# Patient Record
Sex: FEMALE | Race: OTHER | Employment: UNEMPLOYED | URBAN - METROPOLITAN AREA
[De-identification: names, ages, dates, MRNs, and addresses within clinical notes are randomized per-mention and may not be internally consistent; named-entity substitution may affect disease eponyms.]

---

## 2017-06-07 NOTE — TELEPHONE ENCOUNTER
Received incoming fax from patient's [facility] pharmacy requesting a refill for Lisinopril-HCTZ that was originally written on 10-26-16 for 90 tabs with one refill. The patient was to return for follow-up 6 months from last office visit on 10-18-16. Routing to the provider for review.  Tevin Ledzema RN

## 2017-06-12 RX ORDER — LISINOPRIL AND HYDROCHLOROTHIAZIDE 12.5; 2 MG/1; MG/1
1 TABLET ORAL DAILY
Qty: 90 TAB | Refills: 0 | Status: SHIPPED | OUTPATIENT
Start: 2017-06-12 | End: 2017-09-28 | Stop reason: SDUPTHER

## 2017-06-12 NOTE — TELEPHONE ENCOUNTER
Patient notified and informed about refill and that she needs to f/u before refill runs out, she plans to rtc.

## 2017-09-28 ENCOUNTER — OFFICE VISIT (OUTPATIENT)
Dept: FAMILY MEDICINE CLINIC | Age: 42
End: 2017-09-28

## 2017-09-28 VITALS
DIASTOLIC BLOOD PRESSURE: 90 MMHG | WEIGHT: 145.6 LBS | TEMPERATURE: 98.4 F | BODY MASS INDEX: 27.49 KG/M2 | HEART RATE: 68 BPM | SYSTOLIC BLOOD PRESSURE: 151 MMHG | HEIGHT: 61 IN

## 2017-09-28 DIAGNOSIS — R09.81 STUFFY NOSE: ICD-10-CM

## 2017-09-28 RX ORDER — LISINOPRIL AND HYDROCHLOROTHIAZIDE 12.5; 2 MG/1; MG/1
1 TABLET ORAL DAILY
Qty: 90 TAB | Refills: 1 | Status: SHIPPED | OUTPATIENT
Start: 2017-09-28 | End: 2018-03-08 | Stop reason: SDUPTHER

## 2017-09-28 RX ORDER — LORATADINE 10 MG/1
10 TABLET ORAL DAILY
Qty: 30 TAB | Refills: 11 | Status: SHIPPED | OUTPATIENT
Start: 2017-09-28 | End: 2019-06-27

## 2017-09-28 RX ORDER — CALCIUM CARBONATE 750 MG/1
TABLET, CHEWABLE ORAL
Qty: 1 KIT | Refills: 0 | Status: SHIPPED | OUTPATIENT
Start: 2017-09-28 | End: 2020-04-30

## 2017-09-28 NOTE — PROGRESS NOTES
Subjective:     Aime Orourke is a 40 y.o. female who presents for follow up of htn. Diet and Lifestyle: states she hasn't missed any pills, has a hard time getting here because of her work. Cardiovascular ROS: no medication side effects noted, no dyspnea on exertion, no swelling of ankles. New concerns: right earache, which is recurrent over the last year. States she was given something here that helped. Has nasal congestion fairly constantly on the right side too. .     Patient Active Problem List   Diagnosis Code    HTN (hypertension) I10     History reviewed. No pertinent family history.   Social History   Substance Use Topics    Smoking status: Never Smoker    Smokeless tobacco: Never Used    Alcohol use No        Lab Results  Component Value Date/Time   Cholesterol, total 171 05/31/2016 12:09 PM   HDL Cholesterol 39 05/31/2016 12:09 PM   LDL, calculated 99.2 05/31/2016 12:09 PM   Triglyceride 164 05/31/2016 12:09 PM   CHOL/HDL Ratio 4.4 05/31/2016 12:09 PM     Lab Results  Component Value Date/Time   GFR est non-AA >60 05/31/2016 12:09 PM   GFR est AA >60 05/31/2016 12:09 PM   Creatinine 0.64 05/31/2016 12:09 PM   BUN 9 05/31/2016 12:09 PM   Sodium 139 05/31/2016 12:09 PM   Potassium 3.8 05/31/2016 12:09 PM   Chloride 105 05/31/2016 12:09 PM   CO2 23 05/31/2016 12:09 PM     Lab Results   Component Value Date/Time    Sodium 139 05/31/2016 12:09 PM    Potassium 3.8 05/31/2016 12:09 PM    Chloride 105 05/31/2016 12:09 PM    CO2 23 05/31/2016 12:09 PM    Anion gap 11 05/31/2016 12:09 PM    Glucose 113 05/31/2016 12:09 PM    BUN 9 05/31/2016 12:09 PM    Creatinine 0.64 05/31/2016 12:09 PM    BUN/Creatinine ratio 14 05/31/2016 12:09 PM    GFR est AA >60 05/31/2016 12:09 PM    GFR est non-AA >60 05/31/2016 12:09 PM    Calcium 8.9 05/31/2016 12:09 PM                   Objective:   Vitals 9/28/2017 10/18/2016 10/18/2016 10/18/2016 5/31/2016 5/31/2016 5/31/2016   Blood Pressure 151/90 150/78 154/84 151/86 157/92 156/94 -   Pulse 68 - 65 69 72 81 -   Temp 98.4 - - 97.7 - 98.2 -   Height 5' 0.827\" - - 5' 1.024\" - - -   Weight 145 lb 9.6 oz - - 144 lb - 149 lb 6.4 oz -   SpO2 - - - - - - -   BSA 1.68 m2 - - 1.68 m2 - - -   BMI 27.67 kg/m2 - - 27.19 kg/m2 - - -   BP comment - - - - - - ra       Appearance: alert, well appearing, and in no distress. bp by my check 160/90. General exam: left TM partly obscured by wax and Tm dull. Right TM totally obscured by wax, decreased hearing of rubbed figertip on right compared with left. CVS exam BP noted to be moderately elevated today in office, S1, S2 normal, no gallop, no murmur, chest clear, no JVD, no HSM, no edema. Lab review:     Assessment/Plan:     1.hypertension control uncertain. the following changes are made - recheck 2 weeks. she will also monitor at home trying to keep under 140/88. if it remains elevated, will discuss lifestyle and consider med change. refilled current htn med. 2.. Allergic ETD, with cerumen impaction. To use debrox nightly and f/u 2 weeks. Refilled loratidine.

## 2017-09-28 NOTE — PROGRESS NOTES
Coordination of Care  1. Have you been to the ER, urgent care clinic since your last visit? Hospitalized since your last visit? No    2. Have you seen or consulted any other health care providers outside of the [facility] since your last visit? Include any pap smears or colon screening. No    Medications  Medication Reconciliation Performed: no  Patient does need refills     Learning Assessment Complete?  Yes

## 2018-03-08 ENCOUNTER — OFFICE VISIT (OUTPATIENT)
Dept: FAMILY MEDICINE CLINIC | Age: 43
End: 2018-03-08

## 2018-03-08 VITALS
BODY MASS INDEX: 27.75 KG/M2 | SYSTOLIC BLOOD PRESSURE: 130 MMHG | HEIGHT: 61 IN | TEMPERATURE: 98.8 F | WEIGHT: 147 LBS | DIASTOLIC BLOOD PRESSURE: 78 MMHG | HEART RATE: 87 BPM

## 2018-03-08 DIAGNOSIS — M25.511 ACUTE PAIN OF RIGHT SHOULDER: Primary | ICD-10-CM

## 2018-03-08 RX ORDER — LISINOPRIL AND HYDROCHLOROTHIAZIDE 12.5; 2 MG/1; MG/1
1 TABLET ORAL DAILY
Qty: 90 TAB | Refills: 1 | Status: SHIPPED | OUTPATIENT
Start: 2018-03-08 | End: 2018-05-03 | Stop reason: SDUPTHER

## 2018-03-08 RX ORDER — METHOCARBAMOL 500 MG/1
500 TABLET, FILM COATED ORAL 3 TIMES DAILY
Qty: 15 TAB | Refills: 0 | Status: SHIPPED | OUTPATIENT
Start: 2018-03-08 | End: 2018-05-03 | Stop reason: SDUPTHER

## 2018-03-08 RX ORDER — NAPROXEN 500 MG/1
500 TABLET ORAL 2 TIMES DAILY WITH MEALS
Qty: 60 TAB | Refills: 0 | Status: SHIPPED | OUTPATIENT
Start: 2018-03-08 | End: 2018-05-03 | Stop reason: SDUPTHER

## 2018-03-08 NOTE — PATIENT INSTRUCTIONS
Dolor en el hombro: Instrucciones de cuidado - [ Shoulder Pain: Care Instructions ]  Instrucciones de cuidado    Puede lesionar beach hombro al usarlo demasiado brice Winner, belkis pescar o jugar béisbol. Puede suceder belkis parte del desgaste cotidiano por el envejecimiento. Las lesiones de hombro pueden tardar tiempo en sanar, bina beach hombro debería mejorar con [employer]. Beach médico podría recomendar un cabestrillo para descansar el hombro. Si se lesionó el hombro, robin vez necesite pruebas y [place]. La atención de seguimiento es ruth parte clave de beach tratamiento y seguridad. Asegúrese de hacer y acudir a todas las citas, y llame a beach médico si está teniendo problemas. También es ruth buena idea saber los resultados de los exámenes y mantener ruth lista de los medicamentos que danny. ¿Cómo puede cuidarse en el hogar? · [place] [employer] analgésicos (medicamentos para el dolor) exactamente según las indicaciones. ¨ Si el médico le recetó analgésicos, tómelos según las indicaciones. ¨ Si no está tomando un analgésico recetado, pregúntele a beach médico si puede gee prakash de [employer]. ¨ No tome dos o más analgésicos al [facility], a menos que el médico se lo haya indicado. Muchos analgésicos contienen acetaminofén, es decir, Tylenol. El exceso de acetaminofén (Tylenol) puede ser dañino. · Si beach médico le recomienda usar un cabestrillo, úselo belkis se le haya indicado. No se lo quite antes de que se lo indique el médico.  · Aplíquese hielo o ruth compresa fría sobre la sg adolorida brice 10 a 20 minutos cada vez. Póngase un paño kaur entre el hielo y la piel. · Si no hay hinchazón, puede aplicar calor húmedo, ruth almohadilla térmica o un paño tibio sobre el hombro. Algunos médicos sugieren alternar CHUN Sahu y holly. · Descanse el hombro brice algunos días. Si beach médico lo recomienda, puede comenzar a ejercitar el hombro con suavidad, bina no levante nada pesado. ¿Cuándo debe pedir ayuda?   Llame al 911 en cualquier momento que considere que necesita atención de [place]. Por ejemplo, llame si:  ? · Siente dolor u opresión en el pecho. [place] podría ocurrir junto con:  ¨ Sudoración. ¨ Falta de aire. ¨ Náuseas o vómito. ¨ Dolor que se extiende del pecho al nate, la Analy, o hacia prakash o ambos hombros o [id number]. ¨ Mareos o aturdimiento. ¨ Pulso rápido o irregular. Después de llamar al 911, mastique 1 aspirina para adultos. Espere a la ambulancia. No trate de conducir usted mismo un automóvil. ? · Beach brazo o mano está frío o pálido, o cambia de color. ?Llame a beach médico ahora mismo o busque atención médica inmediata si:  ? · Tiene señales de infección, tales belkis:  ¨ Mayor dolor, hinchazón, enrojecimiento o aumento de la temperatura en el hombro. ¨ Vetas rojizas que comienzan en ruth sg del hombro. ¨ Pus que supura de ruth sg del hombro. ¨ Ganglios linfáticos inflamados en el nate, las axilas o la silvano. Jordan Haji. ?Preste especial atención a los cambios en beach sandeep y asegúrese de comunicarse con beach médico si:  ? · No puede usar el hombro. ? · El hombro no mejora belkis se esperaba. ¿Dónde puede encontrar más información en inglés? Sam Presume a http://pretty-pema.info/. Foreign Hebert R899 en la búsqueda para aprender más acerca de \"Dolor en el hombro: Instrucciones de cuidado - [ Shoulder Pain: Care Instructions ]. \"  Revisado: 21 marzo, 2017  Versión del contenido: 11.4  © 3435-5080 Healthwise, Incorporated. Las instrucciones de cuidado fueron adaptadas bajo licencia por [employer]ctions (which disclaims liability or warranty for this information). Si usted tiene [place] afección médica o sobre estas instrucciones, siempre pregunte a beach profesional de sandeep. [employer], [employer]ET niega toda garantía o responsabilidad por beach uso de esta información.        Estiramientos de hombros: Ejercicios - [ Shoulder Stretches: Exercises ]  Instrucciones de Cynthea Mccollum se presentan algunos ejemplos de ejercicios para el hombro. Empiece cada ejercicio lentamente. Reduzca la intensidad del ejercicio si Carlena Plant a tener dolor. Logan médico o fisioterapeuta le dirán cuándo puede comenzar con estos ejercicios y cuáles funcionarán mejor para usted. Cómo hacer los ejercicios  Estiramiento del hombro    1. Párese en un olga de dipika y coloque un brazo contra el olga de la Kobe. El codo debe estar un poco más alto que el hombro. 2. Relaje los hombros a medida que se inclina hacia adelante, permitiendo que se estiren los músculos del pecho y del hombro. También puede girar logan cuerpo ligeramente alejándolo de logan brazo para estirar los músculos aún [place]. 3. Mantenga la posición entre 15 y 27 segundos. 4. Repita de 2 a 4 veces con cada brazo. Estiramiento del hombro y del pecho    1. Estiramiento del hombro y del pecho  2. Estando sentado, relaje la parte superior de logan cuerpo de modo que se deje caer suavemente en logan silla. 3. A medida que inhala, enderece la espalda y maria elena los [employer]. 4. Jale suavemente los omóplatos hacia atrás y [place]. 5. Mantenga la posición brice 15 a 30 segundos mientras respira normalmente. 6. Repita de 2 a 4 veces. Estiramiento sobre la radha    1. [place] ambos brazos por encima de la radha. 2. Mantenga la posición entre 15 y 27 segundos. 3. Repita de 2 a 4 veces. La atención de seguimiento es ruth parte clave de logan tratamiento y seguridad. Asegúrese de hacer y acudir a todas las citas, y llame a logan médico si está teniendo problemas. También es ruth buena idea saber los resultados de los exámenes y mantener ruth lista de los medicamentos que danny. ¿Dónde puede encontrar más información en inglés? Anne-Marie Lower a http://pretty-pema.info/. Kenzie Mcqueen T260 en la búsqueda para aprender más acerca de \"Estiramientos de hombros: Ejercicios - [ Shoulder Stretches: Exercises ]. \"  Revisado: 21 marzo, 2017  Versión del contenido: 11.4  © 8637-7819 Healthwise, Mozido. Las instrucciones de cuidado fueron adaptadas bajo licencia por [employer] Help Connections (which disclaims liability or warranty for this information). Si usted tiene [place] afección médica o sobre estas instrucciones, siempre pregunte a logan profesional de sandeep. 5k Fans, Mozido niega toda garantía o responsabilidad por logan uso de esta información.

## 2018-03-08 NOTE — PROGRESS NOTES
Assessment/Plan:    Diagnoses and all orders for this visit:    1. Acute pain of right shoulder  -     methocarbamol (ROBAXIN) 500 mg tablet; Take 1 Tab by mouth three (3) times daily. [employer] 1 pastilla 3 veces al santiago si necissita  -     naproxen (NAPROSYN) 500 mg tablet; Take 1 Tab by mouth two (2) times daily (with meals). [employer] 1 pastilla 2 vecs al santiago    Other orders  -     lisinopril-hydroCHLOROthiazide (PRINZIDE, ZESTORETIC) 20-12.5 mg per tablet; Take 1 Tab by mouth daily. For blood pressure. Rest for the shoulder, pain meds as needed. She might consider another line of work which might be easier on her shoulder     Follow-up Disposition:  Return in about 6 months (around 9/8/2018), or if symptoms worsen or fail to improve. MOSES Jane expressed understanding of this plan. An AVS was printed and given to the patient.      ----------------------------------------------------------------------    Chief Complaint   Patient presents with    Shoulder Pain    Hypertension     f/u       History of Present Illness:  Pt here for new problem with right shoulder pain, no known injury. 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 She has asked for a long period of absence from this job and she was granted this by her boss. She has been using tylenol and ibu with some relief of the pain. She does not have numbness or tingling. She has been taking her bp meds as rx'd. She has not run out of her meds. She denies the following: chest pain, SoB, PEARSON, extremity swelling       Past Medical History:   Diagnosis Date    Hypertension     Pap smear for cervical cancer screening 2012 nov       Current Outpatient Prescriptions   Medication Sig Dispense Refill    methocarbamol (ROBAXIN) 500 mg tablet Take 1 Tab by mouth three (3) times daily.  [employer] 1 pastilla 3 veces al santiago si necissita 15 Tab 0    naproxen (NAPROSYN) 500 mg tablet Take 1 Tab by mouth two (2) times daily (with meals). [place] 1 pastilla 2 vecs al santiago 60 Tab 0    lisinopril-hydroCHLOROthiazide (PRINZIDE, ZESTORETIC) 20-12.5 mg per tablet Take 1 Tab by mouth daily. For blood pressure. 90 Tab 1    loratadine (CLARITIN) 10 mg tablet Take 1 Tab by mouth daily. Hema cada santiago para alergias 30 Tab 11    carbamide peroxide (DEBROX) 6.5 % otic solution Administer 5 Drops into each ear two (2) times a day. 7.5 mL 1    Blood Pressure Test Kit-Medium kit Can you pick out a reasonably-priced cuff that is likely to be accurate for her? Thanks. Also needs to know how to use it. 1 Kit 0       No Known Allergies    Social History   Substance Use Topics    Smoking status: Never Smoker    Smokeless tobacco: Never Used    Alcohol use No       No family history on file. Physical Exam:     Visit Vitals    /78 (BP 1 Location: Left arm, BP Patient Position: Sitting)    Pulse 87    Temp 98.8 °F (37.1 °C) (Oral)    Ht 5' 1.02\" (1.55 m)    Wt 147 lb (66.7 kg)    BMI 27.75 kg/m2     Looks well, pleasant  A&Ox3  WDWN NAD  Respirations normal and non labored  Right shoulder - no swelling, warmth or redness at the joint.  She has FROM but painful with end extension  She has pain with deep palp AC area and with active rotation

## 2018-03-08 NOTE — PROGRESS NOTES
Coordination of Care  1. Have you been to the ER, urgent care clinic since your last visit? Hospitalized since your last visit? No    2. Have you seen or consulted any other health care providers outside of the [address] [place] [address] since your last visit? Include any pap smears or colon screening. No    Does the patient need refills? YES    Learning Assessment Complete?  yes

## 2018-03-08 NOTE — PROGRESS NOTES
Pt left without coming to discharge today. I looked for pt in the gym as well and pt was not in the gym.  Sandra Soares RN

## 2018-05-03 ENCOUNTER — OFFICE VISIT (OUTPATIENT)
Dept: FAMILY MEDICINE CLINIC | Age: 43
End: 2018-05-03

## 2018-05-03 ENCOUNTER — TELEPHONE (OUTPATIENT)
Dept: FAMILY MEDICINE CLINIC | Age: 43
End: 2018-05-03

## 2018-05-03 VITALS
TEMPERATURE: 98.7 F | BODY MASS INDEX: 29.07 KG/M2 | DIASTOLIC BLOOD PRESSURE: 88 MMHG | HEIGHT: 61 IN | WEIGHT: 154 LBS | SYSTOLIC BLOOD PRESSURE: 156 MMHG | HEART RATE: 92 BPM

## 2018-05-03 DIAGNOSIS — M25.511 ACUTE PAIN OF RIGHT SHOULDER: Primary | ICD-10-CM

## 2018-05-03 DIAGNOSIS — I10 ESSENTIAL HYPERTENSION: ICD-10-CM

## 2018-05-03 RX ORDER — METHOCARBAMOL 500 MG/1
500 TABLET, FILM COATED ORAL 3 TIMES DAILY
Qty: 15 TAB | Refills: 0 | Status: SHIPPED | OUTPATIENT
Start: 2018-05-03 | End: 2019-06-27

## 2018-05-03 RX ORDER — NAPROXEN 500 MG/1
500 TABLET ORAL 2 TIMES DAILY WITH MEALS
Qty: 60 TAB | Refills: 0 | Status: SHIPPED | OUTPATIENT
Start: 2018-05-03 | End: 2019-06-27

## 2018-05-03 RX ORDER — LISINOPRIL AND HYDROCHLOROTHIAZIDE 12.5; 2 MG/1; MG/1
1 TABLET ORAL DAILY
Qty: 90 TAB | Refills: 1 | Status: SHIPPED | OUTPATIENT
Start: 2018-05-03 | End: 2019-06-27 | Stop reason: SDUPTHER

## 2018-05-03 NOTE — PROGRESS NOTES
Assessment/Plan:       ICD-10-CM ICD-9-CM    1. Acute pain of right shoulder M25.511 719.41 methocarbamol (ROBAXIN) 500 mg tablet      naproxen (NAPROSYN) 500 mg tablet   2. Essential hypertension I10 401.9 lisinopril-hydroCHLOROthiazide (PRINZIDE, ZESTORETIC) 20-12.5 mg per tablet   I demonstrated a stretching exercise patient can do every 30-60 minutes when she experiences pain. Try to wean off need for medication. Try heat/ice. Follow-up Disposition:  Return if symptoms worsen or fail to improve. [facility] [address] [place] [address] [address] [facility] 38. [phone]  Phone: [phone] Fax: [phone]  Subjective:     Chief Complaint   Patient presents with    Back Pain     f/u    Envertrufina Arredondo is a 40 y.o. OTHER female. HPI: It is the \"lung\" on the right, was asked to return after finishing the pills. Felt well while taking the pills. When making tortillas it hurts more. 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 Pain now is 7/10 without movement. Her pain goes up to a 9/10, takes a pill and it gets better. Not working currently, staying home. When she uses her hands a lot it hurts. She  has a past medical history of Hypertension and Pap smear for cervical cancer screening (2012 nov). Review of Systems: Negative for: fever, chest pain, shortness of breath, leg swelling, exertional dyspnea, palpitations. Current Medications:   Current Outpatient Prescriptions on File Prior to Visit   Medication Sig    loratadine (CLARITIN) 10 mg tablet Take 1 Tab by mouth daily. Hema cada santiago para alergias    carbamide peroxide (DEBROX) 6.5 % otic solution Administer 5 Drops into each ear two (2) times a day.  Blood Pressure Test Kit-Medium kit Can you pick out a reasonably-priced cuff that is likely to be accurate for her? Thanks. Also needs to know how to use it. No current facility-administered medications on file prior to visit. Social History: She  reports that she has never smoked. She has never used smokeless tobacco. She reports that she does not drink alcohol or use illicit drugs. Objective:     Vitals:    05/03/18 1012   BP: 156/88   Pulse: 92   Temp: 98.7 °F (37.1 °C)   TempSrc: Oral   Weight: 154 lb (69.9 kg)   Height: 5' 0.67\" (1.541 m)    Patient's last menstrual period was 02/28/2018. Wt Readings from Last 2 Encounters:   05/03/18 154 lb (69.9 kg)   03/08/18 147 lb (66.7 kg)     No results found for any visits on 05/03/18. Physical Examination: Tension right lateral back along thoracic vertebrae. General appearance - well developed, no acute distress. Chest - clear to auscultation. Heart - regular rate and rhythm without murmurs, rubs, or gallops. Abdomen - bowel sounds present x 4, NT, ND. Extremities - pulses intact. No peripheral edema. Assessment/Plan:   Diagnoses and all orders for this visit:    1. Acute pain of right shoulder  -     methocarbamol (ROBAXIN) 500 mg tablet; Take 1 Tab by mouth three (3) times daily. [place] 1 pastilla 3 veces al santiago si necissita  -     naproxen (NAPROSYN) 500 mg tablet; Take 1 Tab by mouth two (2) times daily (with meals). [place] 1 pastilla 2 vecs al santiago    2. Essential hypertension  -     lisinopril-hydroCHLOROthiazide (PRINZIDE, ZESTORETIC) 20-12.5 mg per tablet; Take 1 Tab by mouth daily. For blood pressure. Follow-up Disposition:  Return if symptoms worsen or fail to improve. Physical therapy will likely help as will stretching exercises. Marlon Contreras, BEVERLY, FNP-BC, BC-ADM  Aime Orourke expressed understanding of this plan.      20 seconds two times when shoulder hurts;

## 2018-05-03 NOTE — PROGRESS NOTES
Coordination of Care  1. Have you been to the ER, urgent care clinic since your last visit? Hospitalized since your last visit? No    2. Have you seen or consulted any other health care providers outside of the [address] [place] [address] since your last visit? Include any pap smears or colon screening. No    Does the patient need refills? YES    Learning Assessment Complete?  yes  Depression Screening complete in the past 12 months? yes

## 2018-05-03 NOTE — TELEPHONE ENCOUNTER
No, because this medication causes increased blood pressure and it can cause stomach ulcers/bleeding when taken long term. If she is needing it twice a day for the next 30 days, she needs to return to discuss long-term treatment options.

## 2018-05-03 NOTE — TELEPHONE ENCOUNTER
Fax received from patient's [facility] pharmacy stating that the patient is requesting a 90 day supply of Naproxen 500mg that was sent in today. Routing to the provider for review.  Tamara Spence RN

## 2018-05-04 NOTE — TELEPHONE ENCOUNTER
Received call from patient stating that she needs medication, she also stated she is having pain on her lungs. Please call back As soon as possible.       Message routed to Chris Whelan NP and [address]

## 2018-05-04 NOTE — TELEPHONE ENCOUNTER
Call returned w/ assistance of [employer] phone  #[phone]. Reviewed message per provider Kiah Oliveira \"no increase in naproxen amt. \".  Pt has \"not picked up her medications yet\". Instructed to get her 3 medications ordered yesterday and meds were reviewed. Instructed to [facility] the line \" for f/u. Reviewed ER for any emergency and ER visits will generated bills and  she will need to apply for financial assistance to help w/bills.

## 2019-06-27 ENCOUNTER — HOSPITAL ENCOUNTER (OUTPATIENT)
Dept: LAB | Age: 44
Discharge: HOME | End: 2019-06-27

## 2019-06-27 ENCOUNTER — OFFICE VISIT (OUTPATIENT)
Dept: FAMILY MEDICINE CLINIC | Age: 44
End: 2019-06-27

## 2019-06-27 VITALS
DIASTOLIC BLOOD PRESSURE: 73 MMHG | HEART RATE: 79 BPM | SYSTOLIC BLOOD PRESSURE: 126 MMHG | HEIGHT: 61 IN | WEIGHT: 141 LBS | BODY MASS INDEX: 26.62 KG/M2 | TEMPERATURE: 98.4 F

## 2019-06-27 DIAGNOSIS — I10 ESSENTIAL HYPERTENSION: ICD-10-CM

## 2019-06-27 DIAGNOSIS — I10 ESSENTIAL HYPERTENSION: Primary | ICD-10-CM

## 2019-06-27 PROCEDURE — 80053 COMPREHEN METABOLIC PANEL: CPT

## 2019-06-27 PROCEDURE — 83036 HEMOGLOBIN GLYCOSYLATED A1C: CPT

## 2019-06-27 RX ORDER — LISINOPRIL AND HYDROCHLOROTHIAZIDE 12.5; 2 MG/1; MG/1
1 TABLET ORAL DAILY
Qty: 90 TAB | Refills: 1 | Status: SHIPPED | OUTPATIENT
Start: 2019-06-27 | End: 2020-03-01

## 2019-06-27 NOTE — PROGRESS NOTES
Assessment/Plan:       ICD-10-CM ICD-9-CM    1. Essential hypertension X57 871.6 METABOLIC PANEL, COMPREHENSIVE      HEMOGLOBIN A1C WITH EAG      lisinopril-hydroCHLOROthiazide (PRINZIDE, ZESTORETIC) 20-12.5 mg per tablet     Follow-up and Dispositions    · Return in about 6 months (around 12/27/2019) for hypertension. [employer] [address] [place] [address], [address] [place] [address]  [address]  67. [phone]  Phone: [phone] Fax: [phone]      [facility] [address]  Subjective:     Chief Complaint   Patient presents with    Hypertension     follow up and med refill    Envertrufina Carvalho is a 38 y.o. OTHER female. HPI:   She  has a past medical history of Hypertension and Pap smear for cervical cancer screening (2012 nov). Review of Systems: Negative for: fever, chest pain, shortness of breath, leg swelling, exertional dyspnea, palpitations. Current Medications:   Current Outpatient Medications on File Prior to Visit   Medication Sig    Blood Pressure Test Kit-Medium kit Can you pick out a reasonably-priced cuff that is likely to be accurate for her? Thanks. Also needs to know how to use it. No current facility-administered medications on file prior to visit. Social History: She  reports that she has never smoked. She has never used smokeless tobacco. She reports that she does not drink alcohol or use drugs. Objective:     Vitals:    06/27/19 1420   BP: 126/73   Pulse: 79   Temp: 98.4 °F (36.9 °C)   TempSrc: Oral   Weight: 141 lb (64 kg)   Height: 5' 0.83\" (1.545 m)    Patient's last menstrual period was 05/30/2019. Wt Readings from Last 2 Encounters:   06/27/19 141 lb (64 kg)   05/03/18 154 lb (69.9 kg)   Patient states she has lost weight. No results found for any visits on 06/27/19. Physical Examination:  General appearance - well developed, no acute distress. Chest - clear to auscultation.   Heart - regular rate and rhythm without murmurs, rubs, or gallops. Abdomen - bowel sounds present x 4, NT, ND. Extremities - pulses intact. No peripheral edema. Assessment/Plan:   Diagnoses and all orders for this visit:    1. Essential hypertension  -     METABOLIC PANEL, COMPREHENSIVE; Future  -     HEMOGLOBIN A1C WITH EAG; Future  -     lisinopril-hydroCHLOROthiazide (PRINZIDE, ZESTORETIC) 20-12.5 mg per tablet; Take 1 Tab by mouth daily. For blood pressure. No labs since 2016. She has eaten today. Follow-up and Dispositions    · Return in about 6 months (around 12/27/2019) for hypertension. Kirill Shetty DNP, FNP-BC, BC-ADM  Aime Orourke expressed understanding of this plan.

## 2019-06-27 NOTE — PROGRESS NOTES
Reviewed AVS, prescription and pharmacy location with patient. AVS printed and given along with a goodrx coupon. Patient aware that provider would like to follow up about today's visit in 6 months as a walk in . This has been fully explained to the patient, who indicates understanding and agrees with plan. No further questions at this time.  Lulu Bennett RN

## 2019-06-27 NOTE — PROGRESS NOTES
Coordination of Care  1. Have you been to the ER, urgent care clinic since your last visit? Hospitalized since your last visit? No    2. Have you seen or consulted any other health care providers outside of the [address] [place] [address] since your last visit? Include any pap smears or colon screening. No    Does the patient need refills? YES    Learning Assessment Complete?  yes

## 2019-06-28 LAB
ALBUMIN SERPL-MCNC: 4.1 G/DL (ref 3.5–5)
ALBUMIN/GLOB SERPL: 1.4 {RATIO} (ref 1.1–2.2)
ALP SERPL-CCNC: 81 U/L (ref 45–117)
ALT SERPL-CCNC: 22 U/L (ref 12–78)
ANION GAP SERPL CALC-SCNC: 8 MMOL/L (ref 5–15)
AST SERPL-CCNC: 14 U/L (ref 15–37)
BILIRUB SERPL-MCNC: 0.6 MG/DL (ref 0.2–1)
BUN SERPL-MCNC: 13 MG/DL (ref 6–20)
BUN/CREAT SERPL: 17 (ref 12–20)
CALCIUM SERPL-MCNC: 9 MG/DL (ref 8.5–10.1)
CHLORIDE SERPL-SCNC: 103 MMOL/L (ref 97–108)
CO2 SERPL-SCNC: 26 MMOL/L (ref 21–32)
CREAT SERPL-MCNC: 0.76 MG/DL (ref 0.55–1.02)
EST. AVERAGE GLUCOSE BLD GHB EST-MCNC: 111 MG/DL
GLOBULIN SER CALC-MCNC: 3 G/DL (ref 2–4)
GLUCOSE SERPL-MCNC: 111 MG/DL (ref 65–100)
HBA1C MFR BLD: 5.5 % (ref 4.2–6.3)
POTASSIUM SERPL-SCNC: 3.8 MMOL/L (ref 3.5–5.1)
PROT SERPL-MCNC: 7.1 G/DL (ref 6.4–8.2)
SODIUM SERPL-SCNC: 137 MMOL/L (ref 136–145)

## 2019-12-26 ENCOUNTER — OFFICE VISIT (OUTPATIENT)
Dept: FAMILY MEDICINE CLINIC | Age: 44
End: 2019-12-26

## 2019-12-26 VITALS
BODY MASS INDEX: 27.19 KG/M2 | SYSTOLIC BLOOD PRESSURE: 122 MMHG | OXYGEN SATURATION: 99 % | HEART RATE: 79 BPM | TEMPERATURE: 98.1 F | WEIGHT: 144 LBS | DIASTOLIC BLOOD PRESSURE: 76 MMHG | HEIGHT: 61 IN

## 2019-12-26 DIAGNOSIS — G89.29 CHRONIC RIGHT SHOULDER PAIN: ICD-10-CM

## 2019-12-26 DIAGNOSIS — H10.022 OTHER MUCOPURULENT CONJUNCTIVITIS OF LEFT EYE: Primary | ICD-10-CM

## 2019-12-26 DIAGNOSIS — J06.9 URI, ACUTE: ICD-10-CM

## 2019-12-26 DIAGNOSIS — M25.511 CHRONIC RIGHT SHOULDER PAIN: ICD-10-CM

## 2019-12-26 RX ORDER — ERYTHROMYCIN 5 MG/G
OINTMENT OPHTHALMIC
Qty: 1 G | Refills: 1 | Status: SHIPPED | OUTPATIENT
Start: 2019-12-26 | End: 2020-04-30

## 2019-12-26 RX ORDER — PSEUDOEPHEDRINE HCL 30 MG
TABLET ORAL
Qty: 20 TAB | Refills: 0 | Status: SHIPPED | OUTPATIENT
Start: 2019-12-26 | End: 2020-04-30

## 2019-12-26 RX ORDER — IBUPROFEN 600 MG/1
600 TABLET ORAL
Qty: 60 TAB | Refills: 0 | Status: SHIPPED | OUTPATIENT
Start: 2019-12-26 | End: 2022-05-26 | Stop reason: SDUPTHER

## 2019-12-26 NOTE — PATIENT INSTRUCTIONS
Conjuntivitis: Instrucciones de cuidado - [ Pinkeye: Care Instructions ] Instrucciones de cuidado La conjuntivitis es el enrojecimiento y la hinchazón de la superficie del james y de la conjuntiva (el recubrimiento del párpado y de la parte matt del james). La conjuntivitis suele ser causada por ruth infección bacteriana o viral. El aire seco, las [place] humo y las sustancias químicas son otras causas comunes. La conjuntivitis suele sanar por sí thanh al cabo de 7 a 10 días. Los antibióticos solo ayudan si la conjuntivitis está causada por bacterias. La conjuntivitis causada por ruth infección se propaga fácilmente. Si ruth alergia o sustancia química es la causa de la conjuntivitis, esta no desaparecerá a menos que usted evite lo que la esté causando. La atención de seguimiento es ruth parte clave de logan tratamiento y seguridad. Asegúrese de hacer y acudir a todas las citas, y llame a logan médico si está teniendo problemas. También es ruth buena idea saber los resultados de chelsea exámenes y mantener ruth lista de los medicamentos que danny. Cómo puede cuidarse en el hogar? · Lávese las adelfo con frecuencia. Siempre láveselas antes y después de tratarse la conjuntivitis o de tocarse los ojos o la nixon. · Utilice un algodón húmedo o un paño limpio y húmedo para retirar las costras. Limpie desde la esquina interior del james hacia afuera. Use ruth parte limpia del paño para cada pasada. · Colóquese paños húmedos, fríos o tibios, sobre el james unas cuantas veces al día si el james le duele. · No use lentes de contacto ni maquillaje para los ojos hasta que la conjuntivitis haya desaparecido. Deseche todo el maquillaje para ojos que usaba cuando comenzó la conjuntivitis. Limpie chelsea lentes de contacto y logan estuche. Si [place] lentes de contacto desechables, use un par nuevo cuando el james haya sanado y sea seguro volver a usar lentes de contacto.  
· Si el médico le recetó ruth pomada o gotas antibióticas para los ojos, [address] indicaciones. Use el medicamento todo el tiempo indicado, aunque el james comience a verse mejor en poco tiempo. Mantenga limpia la punta del frasco y no permita que la misma toque la sg del james. · Para ponerse gotas para los ojos o pomada: ? Incline la Sirena Crow atrás y lleve el párpado inferior hacia abajo con un dedo. ? Deje caer unas gotas o un chorrito del medicamento dentro del párpado inferior. ? Cierre el james por entre 30 y 61 segundos para permitir que las gotas o la pomada se esparzan. ? No permita que la punta del gotero o del tubo de pomada toque las pestañas ni ninguna otra superficie. · No comparta toallas de baño, almohadas ni toallitas para la nixon mientras tenga conjuntivitis. Cuándo debe pedir ayuda? Llame a logan médico ahora mismo o busque atención médica inmediata si: 
  · Tiene dolor en el james, no solo irritación en la superficie.  
  · Tiene algún cambio en la vista o pérdida de la visión.  
  · Tiene mayor secreción del james.  
  · El james no ha comenzado a mejorar o empeora dentro de las 50 horas después de empezar a usar antibióticos.  
  · La conjuntivitis dura más de 7 días.  
 Preste especial atención a los cambios en logan sandeep y asegúrese de comunicarse con logan médico si tiene algún problema. Dónde puede encontrar más información en inglés? Johnny Beasley a http://pretty-pema.info/. [place] Y392 en la búsqueda para aprender más acerca de \"Conjuntivitis: Instrucciones de cuidado - [ Pj: Care Instructions ]. \" 
Revisado: 26 kwasi, 2019 Versión del contenido: 12.2 © 5972-6156 [employer]. Las instrucciones de cuidado fueron adaptadas bajo licencia por [employer] (which disclaims liability or warranty for this information). Si usted tiene [place] afección médica o sobre estas instrucciones, siempre pregunte a logan profesional de sandeep.  [employer] niega toda gloriaa o responsabilidad por logan uso de esta información. Estiramientos de hombros: Ejercicios - [ Shoulder Stretches: Exercises ] Instrucciones de cuidado Aquí se presentan algunos ejemplos de ejercicios para el hombro. Empiece cada ejercicio lentamente. Reduzca la intensidad del ejercicio si Malachy Spurling a tener dolor. Logan médico o fisioterapeuta le dirán cuándo puede comenzar con estos ejercicios y cuáles funcionarán mejor para usted. Cómo hacer los ejercicios Estiramiento del hombro 1. Párese en un olga de dipika y coloque un brazo contra el olga de altagracia Bullock. El codo debe estar un poco más alto que el hombro. 2. Relaje los hombros a medida que se inclina hacia adelante, permitiendo que se estiren los músculos del pecho y del hombro. También puede girar logan cuerpo ligeramente alejándolo de logan brazo para estirar los músculos aún [place]. 3. Mantenga la posición entre 15 y 27 segundos. 4. Repita de 2 a 4 veces con cada brazo. Estiramiento del hombro y del pecho 1. Estiramiento del hombro y del pecho 2. Estando sentado, relaje la parte superior de logan cuerpo de modo que se deje caer suavemente en logan silla. 3. A medida que inhala, enderece la espalda y maria elena los [employer] 4. Jale suavemente los omóplatos hacia atrás y [place]. 5. Mantenga la posición brice 15 a 30 segundos mientras respira normalmente. 6. Repita de 2 a 4 veces. Estiramiento sobre la Maria Elena Alcal[place] 1. [place] ambos brazos por encima de la radha. 2. Mantenga la posición entre 15 y 27 segundos. 3. Repita de 2 a 4 veces. La atención de seguimiento es ruth parte clave de logan tratamiento y seguridad. Asegúrese de hacer y acudir a todas las citas, y llame a logan médico si está teniendo problemas. También es ruth buena idea saber los resultados de chelsea exámenes y mantener ruth lista de los medicamentos que danny. Dónde puede encontrar más información en inglés? James Bene a http://pretty-pema.info/. Farida Anderson H415 en la búsqueda para aprender más acerca de \"Estiramientos de hombros: Ejercicios - [ Shoulder Stretches: Exercises ]. \" 
Revisado: 26 junio, 2019 Versión del contenido: 12.2 © 3654-1001 [employer]. Las instrucciones de cuidado fueron adaptadas bajo licencia por [employer] (which disclaims liability or warranty for this information). Si usted tiene [place] afección médica o sobre estas instrucciones, siempre pregunte a logan profesional de sandeep. Healthwise, Incorporated niega toda garantía o responsabilidad por logan uso de esta información.

## 2019-12-26 NOTE — PROGRESS NOTES
Reviewed AVS, prescription and pharmacy location with patient. AVS printed and given along with goodrx coupons. Patient aware to RTC if s/s worsen or fail to improve. [facility] flyer given and instructed to call to go over financials screening. This has been fully explained to the patient, who indicates understanding and agrees with plan. No further questions at this time.  Barbie Teresa RN

## 2019-12-26 NOTE — PROGRESS NOTES
Coordination of Care  1. Have you been to the ER, urgent care clinic since your last visit? Hospitalized since your last visit? No    2. Have you seen or consulted any other health care providers outside of the [address] [place] [address] since your last visit? Include any pap smears or colon screening. No    Does the patient need refills? YES    Learning Assessment Complete?  yes  Depression Screening complete in the past 12 months? yes

## 2019-12-26 NOTE — PROGRESS NOTES
Assessment/Plan:    Diagnoses and all orders for this visit:    1. Other mucopurulent conjunctivitis of left eye  -     erythromycin (ILOTYCIN) ophthalmic ointment; Si.5 cm ribbon of ointment OU qid for 5 days. Mosotho sig please    2. URI, acute  -     pseudoephedrine (SUDAFED) 30 mg tablet; Si po q 6 hours PRN ear congestion    3. Chronic right shoulder pain  -     ibuprofen (MOTRIN) 600 mg tablet; Take 1 Tab by mouth every six (6) hours as needed for Pain. Follow-up and Dispositions    · Return if symptoms worsen or fail to improve. MOSES Chandra expressed understanding of this plan. An AVS was printed and given to the patient.      ----------------------------------------------------------------------    Chief Complaint   Patient presents with    Eye Problem     patient c/o swelling on left eye    Shoulder Pain     pt c/o pain on right shoulder       History of Present Illness:    1. 8 days of left eye draining and \"swelling\" and redness. Started with cough and sore throat before these sxs started. The cough and ST have resolved. Still has left ear pain. This pain seem to come and go pretty frequently but now has been present for 3 days. No known fever  2. 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 She states that on her days off from work, the pain is not present. She was seen here in  for this and the meds worked [facility]", those were naprosyn and robaxin per her chart  3. Due for pap and mammo- refer to [facility]     Past Medical History:   Diagnosis Date    Hypertension     Pap smear for cervical cancer screening 2012       Current Outpatient Medications   Medication Sig Dispense Refill    erythromycin (ILOTYCIN) ophthalmic ointment Si.5 cm ribbon of ointment OU qid for 5 days. Mosotho sig please 1 g 1    ibuprofen (MOTRIN) 600 mg tablet Take 1 Tab by mouth every six (6) hours as needed for Pain.  61 Tab 0    pseudoephedrine (SUDAFED) 30 mg tablet Si po q 6 hours PRN ear congestion 20 Tab 0    lisinopril-hydroCHLOROthiazide (PRINZIDE, ZESTORETIC) 20-12.5 mg per tablet Take 1 Tab by mouth daily. For blood pressure. 90 Tab 1    Blood Pressure Test Kit-Medium kit Can you pick out a reasonably-priced cuff that is likely to be accurate for her? Thanks. Also needs to know how to use it. 1 Kit 0       No Known Allergies    Social History     Tobacco Use    Smoking status: Never Smoker    Smokeless tobacco: Never Used   Substance Use Topics    Alcohol use: No     Alcohol/week: 0.0 standard drinks    Drug use: No       No family history on file. Physical Exam:     Visit Vitals  /76 (BP 1 Location: Right arm, BP Patient Position: Sitting)   Pulse 79   Temp 98.1 °F (36.7 °C) (Oral)   Ht 5' 1.42\" (1.56 m)   Wt 144 lb (65.3 kg)   LMP 2019   SpO2 99%   BMI 26.84 kg/m²       A&Ox3  [facility] NAD  Respirations normal and non labored  HEENT- tang eyes are red with increased tears, no pus present  TM- right bulging ? Injected, left with clear fluid.  Canals patent  OP clear  Neck supple  Right shoulder - FROM , not able to reproduce the sxs on exam today

## 2020-03-01 DIAGNOSIS — I10 ESSENTIAL HYPERTENSION: ICD-10-CM

## 2020-03-01 RX ORDER — LISINOPRIL AND HYDROCHLOROTHIAZIDE 12.5; 2 MG/1; MG/1
TABLET ORAL
Qty: 90 TAB | Refills: 0 | Status: SHIPPED | OUTPATIENT
Start: 2020-03-01 | End: 2020-04-30 | Stop reason: SDUPTHER

## 2020-03-11 NOTE — TELEPHONE ENCOUNTER
Telephone call made to the patient with assistance from Eric, registrar/, to give her the provider's caution about not taking the refilled blood pressure medicine if she is pregnant. There was no answer and no voicemail set up to accept messages. We will try again to reach her by phone and then send a letter if we can not reach her.  Rodrigo Sloan RN

## 2020-03-12 NOTE — TELEPHONE ENCOUNTER
T/C made to the patient with assistance from Eric /registrar. We reviewed the provider's note with the patient who expressed understanding. Patient stated that she has not picked-up the prescription yet. She also understands that she needs to return to see a provider at the [facility] before the prescription runs out. We reviewed the new phone line process to request same day appointments.  Erica Delcid RN

## 2020-04-30 ENCOUNTER — OFFICE VISIT (OUTPATIENT)
Dept: FAMILY MEDICINE CLINIC | Age: 45
End: 2020-04-30

## 2020-04-30 DIAGNOSIS — H92.01 EAR PAIN, RIGHT: Primary | ICD-10-CM

## 2020-04-30 DIAGNOSIS — I10 ESSENTIAL HYPERTENSION: ICD-10-CM

## 2020-04-30 DIAGNOSIS — H57.9 SENSATION OF FOREIGN BODY IN EYE: ICD-10-CM

## 2020-04-30 RX ORDER — LISINOPRIL AND HYDROCHLOROTHIAZIDE 12.5; 2 MG/1; MG/1
TABLET ORAL
Qty: 90 TAB | Refills: 1 | Status: SHIPPED | OUTPATIENT
Start: 2020-04-30 | End: 2020-12-10 | Stop reason: SDUPTHER

## 2020-04-30 RX ORDER — DEXTRAN 70, AND HYPROMELLOSE 2910 1; 3 MG/ML; MG/ML
2 SOLUTION/ DROPS OPHTHALMIC EVERY 6 HOURS
Qty: 30 ML | Refills: 1 | Status: SHIPPED | OUTPATIENT
Start: 2020-04-30 | End: 2021-06-22 | Stop reason: ALTCHOICE

## 2020-04-30 NOTE — PROGRESS NOTES
Coordination of Care  1. Have you been to the ER, urgent care clinic since your last visit? Hospitalized since your last visit? No    2. Have you seen or consulted any other health care providers outside of the [address] [place] [address] since your last visit? Include any pap smears or colon screening. No    Does the patient need refills? YES, needs bp pills    Learning Assessment Complete? no  Depression Screening complete in the past 12 months? no     Discharge nurse encounter completed via telephoned call. Name and  verified. AVS, prescription and pharmacy location reviewed . [facility]QU coupon code  sent via text per patient's request. Patient to follow up in 3 months; message has been sent to registration. This has been fully explained to the patient, who indicates understanding and agrees with plan. No further questions at this time.  Jordy Eaton RN

## 2020-04-30 NOTE — PROGRESS NOTES
4/30/2020 [phone]  Aime Orourke consented to Telephone visit. Patient speaks Sami. : Laina Tadeo  Assessment/Plan:   Return 3 months (before meds run out) for recheck (and to make sure still not pregnant.)    ICD-10-CM ICD-9-CM    1. Ear pain, right H92.01 388.70 carbamide peroxide (DEBROX) 6.5 % otic solution   2. Sensation of foreign body in eye H57.9 379.99 artificial tears, dextran 70-hypromellose, (Natural Balance Tears) 0.1-0.3 % ophthalmic solution   3. Essential hypertension I10 401.9 lisinopril-hydroCHLOROthiazide (PRINZIDE, ZESTORETIC) 20-12.5 mg per tablet     Follow-up and Dispositions    · Return in about 3 months (around 7/30/2020) for hypertension. natural tears  Subjective:   Aime Ponce is a 38 y.o. OTHER female. Chief Complaint   Patient presents with    Medication Refill     HTN    Ear Pain     right ear, pain 9/10    Swelling     eye     HPI:Patient given refill for lisin/hct in March and I asked to return because she can't take this if she is pregnant. Also here for an ear ache. Liquid is coming out. Affecting the hearing which is not new. Liquid coming out since October or November. Does have history of wax in the right ear. Ear pain  Her right ear hurts, rates the pain 9/10. Stuffy nose in the afternoon and pain in forehead. It feels like liquid is coming out. Eye pain  Especially upon opening and closing of the eyelids. No change in vision. States it feels like last time, as if something is in the eye. The ointment did not help. When she wakes up in the morning the eye is not stuck closed. Describes sensation of \"Swelling of the eye\". ROS: positive for ear ache, eye \"swelling\"  No change in vision. No fever. Has noted diminished hearing which is not new. No dyspnea or chest pain on exertion. No abdominal pain, change in bowel habits, black or bloody stools. No urinary tract symptoms.    No neurological complaints. Using birth control? Yes  Patient's last menstrual period was 03/28/2020 (approximate). She usually has a period every 3 months. Current Outpatient Medications   Medication Sig Dispense Refill    artificial tears, dextran 70-hypromellose, (Natural Balance Tears) 0.1-0.3 % ophthalmic solution Administer 2 Drops to left eye every six (6) hours. Please dispense least expensive generic 30 mL 1    carbamide peroxide (DEBROX) 6.5 % otic solution Administer 5 Drops in right ear two (2) times a day. For 1 week. Chinese sig 7.5 mL 1    lisinopril-hydroCHLOROthiazide (PRINZIDE, ZESTORETIC) 20-12.5 mg per tablet TAKE 1 TABLET BY MOUTH DAILY FOR BLOOD PRESSURE 90 Tab 1    ibuprofen (MOTRIN) 600 mg tablet Take 1 Tab by mouth every six (6) hours as needed for Pain. 60 Tab 0      reports that she has never smoked. She has never used smokeless tobacco. She reports that she does not drink alcohol or use drugs. Objective: There were no vitals filed for this visit. No results found for any visits on 04/30/20. Physical exam not performed due to telephone visit. Assessment/Plan:   Diagnoses and all orders for this visit:    1. Ear pain, right  -     carbamide peroxide (DEBROX) 6.5 % otic solution; Administer 5 Drops in right ear two (2) times a day. For 1 week. Chinese sig    2. Sensation of foreign body in eye  -     artificial tears, dextran 70-hypromellose, (Natural Balance Tears) 0.1-0.3 % ophthalmic solution; Administer 2 Drops to left eye every six (6) hours. Please dispense least expensive generic    3. Essential hypertension  -     lisinopril-hydroCHLOROthiazide (PRINZIDE, ZESTORETIC) 20-12.5 mg per tablet; TAKE 1 TABLET BY MOUTH DAILY FOR BLOOD PRESSURE      Emry Setting, MAXIMILIANO Orourke expressed understanding of this plan.

## 2020-09-03 ENCOUNTER — TELEPHONE (OUTPATIENT)
Dept: FAMILY MEDICINE CLINIC | Age: 45
End: 2020-09-03

## 2020-09-03 NOTE — TELEPHONE ENCOUNTER
I called patient to offer and schedule an appointment from our [address] appointment files. No answer, and voicemail is not yet set up.

## 2020-09-10 ENCOUNTER — VIRTUAL VISIT (OUTPATIENT)
Dept: FAMILY MEDICINE CLINIC | Age: 45
End: 2020-09-10

## 2020-09-10 DIAGNOSIS — R12 HEARTBURN: Primary | ICD-10-CM

## 2020-09-10 DIAGNOSIS — H57.9 SENSATION OF FOREIGN BODY IN EYE: ICD-10-CM

## 2020-09-10 PROCEDURE — 99213 OFFICE O/P EST LOW 20 MIN: CPT | Performed by: FAMILY MEDICINE

## 2020-09-10 RX ORDER — ESOMEPRAZOLE MAGNESIUM 40 MG/1
40 CAPSULE, DELAYED RELEASE ORAL DAILY
Qty: 30 CAP | Refills: 2 | Status: SHIPPED | OUTPATIENT
Start: 2020-09-10 | End: 2021-06-22 | Stop reason: SDUPTHER

## 2020-09-10 NOTE — PROGRESS NOTES
HISTORY OF PRESENT ILLNESS  Aime Chappell is a 89 hospitals y.o. female. HPI  I was at home while conducting this encounter. Consent:  She and/or her healthcare decision maker is aware that this patient-initiated Telehealth encounter is a billable service, with coverage as determined by her insurance carrier. She is aware that she may receive a bill and has provided verbal consent to proceed: Yes    This virtual visit was conducted telephone encounter only. -  I affirm this is a Patient Initiated Episode with an Established Patient who has not had a related appointment within my department in the past 7 days or scheduled within the next 24 hours. Note: this encounter is not billable if this call serves to triage the patient into an appointment for the relevant concern. Total Time: minutes: 11-20 minutes. Patient states she has had a problem with the left eye for the past 10 months. She was prescribed eye drops and antibiotics. She still feels a foreign body sensation on the left eye, and feels the upper eyelid is swollen. The other problem she is having is that when she eats or drinks something she has acid reflux. This has been going on for the past 2 months. So far tried some OTC acid reducer but she keeps having the symptoms. ROS    Physical Exam    ASSESSMENT and PLAN  Diagnoses and all orders for this visit:    1. Heartburn  -     esomeprazole (NEXIUM) 40 mg capsule; Take 1 Cap by mouth daily.     2. Sensation of foreign body in eye      We will bring her in to assess the eye problem in a face to face visit

## 2020-09-10 NOTE — PROGRESS NOTES
Coordination of Care  1. Have you been to the ER, urgent care clinic since your last visit? Hospitalized since your last visit? No    2. Have you seen or consulted any other health care providers outside of the [address] [place] [address] since your last visit? Include any pap smears or colon screening. No    Does the patient need refills? YES    Learning Assessment Complete?  yes  Depression Screening complete in the past 12 months? yes

## 2020-12-10 ENCOUNTER — VIRTUAL VISIT (OUTPATIENT)
Dept: FAMILY MEDICINE CLINIC | Age: 45
End: 2020-12-10

## 2020-12-10 ENCOUNTER — TELEPHONE (OUTPATIENT)
Dept: FAMILY MEDICINE CLINIC | Age: 45
End: 2020-12-10

## 2020-12-10 DIAGNOSIS — I10 ESSENTIAL HYPERTENSION: ICD-10-CM

## 2020-12-10 PROCEDURE — 99442 PR PHYS/QHP TELEPHONE EVALUATION 11-20 MIN: CPT | Performed by: PHYSICIAN ASSISTANT

## 2020-12-10 RX ORDER — LISINOPRIL AND HYDROCHLOROTHIAZIDE 12.5; 2 MG/1; MG/1
TABLET ORAL
Qty: 90 TAB | Refills: 1 | Status: SHIPPED | OUTPATIENT
Start: 2020-12-10 | End: 2021-06-22 | Stop reason: SDUPTHER

## 2020-12-10 NOTE — PROGRESS NOTES
Provided pt with information and phone # for [facility]. Explained that they will do a financial screening before scheduling appt. Marcia Dias was the  for this call Michael Ferrer RN

## 2020-12-10 NOTE — PROGRESS NOTES
Assessment/Plan:    Diagnoses and all orders for this visit:    1. Essential hypertension  -     lisinopril-hydroCHLOROthiazide (PRINZIDE, ZESTORETIC) 20-12.5 mg per tablet; TAKE 1 TABLET BY MOUTH DAILY FOR BLOOD PRESSURE        Follow-up and Dispositions    · Return in about 5 months (around 5/10/2021) for needs face to face in about 5 months, flu shot appt asap would be great . MOSES Gaming expressed understanding of this plan. An AVS was printed and given to the patient.      ----------------------------------------------------------------------    Chief Complaint   Patient presents with    Medication Refill       History of Present Illness:    Pt called and consented to virtual telephone visit, she declined video  She was identified by name and     She is being seen today for a refill on her bp medication. She has NOT run out of it but only has a little left  She has been taking it daily as directed. She tells me that her bp is well controlled on this medication  [facility] for an [facility] infection and they told her that her bp was \"just a little elevated\" due to the pain  She is on abx as advised and her ear pain has resolved    She was told that she needs to be seen for check up. She had her labs last in 2019  She has never had a mammogram and is due for pap, she agrees to referral to [facility]    Past Medical History:   Diagnosis Date    Hypertension     Pap smear for cervical cancer screening 2012       Current Outpatient Medications   Medication Sig Dispense Refill    lisinopril-hydroCHLOROthiazide (PRINZIDE, ZESTORETIC) 20-12.5 mg per tablet TAKE 1 TABLET BY MOUTH DAILY FOR BLOOD PRESSURE 90 Tab 1    ibuprofen (MOTRIN) 600 mg tablet Take 1 Tab by mouth every six (6) hours as needed for Pain. 60 Tab 0    esomeprazole (NEXIUM) 40 mg capsule Take 1 Cap by mouth daily.  30 Cap 2    artificial tears, dextran 70-hypromellose, (Natural Balance Tears) 0.1-0.3 % ophthalmic solution Administer 2 Drops to left eye every six (6) hours. Please dispense least expensive generic 30 mL 1       No Known Allergies    Social History     Tobacco Use    Smoking status: Never Smoker    Smokeless tobacco: Never Used   Substance Use Topics    Alcohol use: No     Alcohol/week: 0.0 standard drinks    Drug use: No       No family history on file. Physical Exam:     There were no vitals taken for this visit.     A&Ox3  [facility] NAD  Respirations normal and non labored

## 2020-12-10 NOTE — PROGRESS NOTES
Coordination of Care  1. Have you been to the ER, urgent care clinic since your last visit? Hospitalized since your last visit? No    2. Have you seen or consulted any other health care providers outside of the [address] [place] [address] since your last visit? Include any pap smears or colon screening. [facility] 12/3/20    Does the patient need refills? YES    Learning Assessment Complete?  yes

## 2020-12-10 NOTE — TELEPHONE ENCOUNTER
I called patient to offer and schedule a flu vaccine appointment.  No answer, voicemail message left

## 2021-06-10 ENCOUNTER — TELEPHONE (OUTPATIENT)
Dept: FAMILY MEDICINE CLINIC | Age: 46
End: 2021-06-10

## 2021-06-10 NOTE — TELEPHONE ENCOUNTER
F/u per Jax for f/u as of  5/10. ( 5 mo checkup). Due to [address] and work schedule of patient we scheduled with another provider at this time. Verified address at this time. No to all screening questions.

## 2021-06-22 ENCOUNTER — OFFICE VISIT (OUTPATIENT)
Dept: FAMILY MEDICINE CLINIC | Age: 46
End: 2021-06-22

## 2021-06-22 VITALS
HEART RATE: 81 BPM | TEMPERATURE: 97.6 F | SYSTOLIC BLOOD PRESSURE: 138 MMHG | BODY MASS INDEX: 27.72 KG/M2 | WEIGHT: 146.8 LBS | DIASTOLIC BLOOD PRESSURE: 82 MMHG | HEIGHT: 61 IN | OXYGEN SATURATION: 97 %

## 2021-06-22 DIAGNOSIS — I10 ESSENTIAL HYPERTENSION: Primary | ICD-10-CM

## 2021-06-22 DIAGNOSIS — R12 HEARTBURN: ICD-10-CM

## 2021-06-22 PROCEDURE — 80053 COMPREHEN METABOLIC PANEL: CPT

## 2021-06-22 PROCEDURE — 83036 HEMOGLOBIN GLYCOSYLATED A1C: CPT

## 2021-06-22 PROCEDURE — 80061 LIPID PANEL: CPT

## 2021-06-22 PROCEDURE — 86803 HEPATITIS C AB TEST: CPT

## 2021-06-22 PROCEDURE — 99213 OFFICE O/P EST LOW 20 MIN: CPT | Performed by: FAMILY MEDICINE

## 2021-06-22 RX ORDER — LISINOPRIL AND HYDROCHLOROTHIAZIDE 12.5; 2 MG/1; MG/1
TABLET ORAL
Qty: 90 TABLET | Refills: 1 | Status: SHIPPED | OUTPATIENT
Start: 2021-06-22 | End: 2021-07-13 | Stop reason: SDUPTHER

## 2021-06-22 RX ORDER — ESOMEPRAZOLE MAGNESIUM 40 MG/1
40 CAPSULE, DELAYED RELEASE ORAL DAILY
Qty: 30 CAPSULE | Refills: 2 | Status: SHIPPED | OUTPATIENT
Start: 2021-06-22 | End: 2021-07-13 | Stop reason: SDUPTHER

## 2021-06-22 NOTE — PROGRESS NOTES
Coordination of Care  1. Have you been to the ER, urgent care clinic since your last visit? Hospitalized since your last visit? No    2. Have you seen or consulted any other health care providers outside of the [address] [place] [address] since your last visit? Include any pap smears or colon screening. No    Does the patient need refills? YES    Learning Assessment Complete?  yes  Depression Screening complete in the past 12 months? yes

## 2021-06-22 NOTE — PROGRESS NOTES
Aime Davila is a 40 y.o. female   Chief Complaint   Patient presents with    Follow-up    Medication Refill         ASSESSMENT AND PLAN:    1. Essential hypertension  Adequate control on current meds. Continiue. Continue healthy eating and exercise  Screening labs today, f/u pending results. - lisinopril-hydroCHLOROthiazide (PRINZIDE, ZESTORETIC) 20-12.5 mg per tablet; TAKE 1 TABLET BY MOUTH DAILY FOR BLOOD PRESSURE  Dispense: 90 Tablet; Refill: 1  - HEMOGLOBIN A1C WITH EAG; Future  - LIPID PANEL; Future  - METABOLIC PANEL, COMPREHENSIVE; Future  - HEPATITIS C AB; Future    2. Heartburn  Controlled with PRN Nexium. Continue    - esomeprazole (NEXIUM) 40 mg capsule; Take 1 Capsule by mouth daily. Dispense: 30 Capsule; Refill: 2          SUBJECTIVE:    HPI:  Aime Davila is a 40 y.o. female who presents for followup. She received a call saying tshe needed an appointment and she's not exactly sure why. She reports her blood pressure seems to be doing well. She denies vision changes, dizziness, headaches, chest pain, palpitations and peripheral edema. She does occasionally get heartburn, but currently it is well-controlled with PRN nexium, which she would like to continue. Reports a normal pap smear about 3 years ago. Mammogram was around the same time    Otherwise she is feeling well and she has no current concerns. ROS per HPI, otherwise negative      /82 (BP 1 Location: Left arm, BP Patient Position: Sitting)   Pulse 81   Temp 97.6 °F (36.4 °C)   Ht 5' 0.83\" (1.545 m)   Wt 146 lb 12.8 oz (66.6 kg)   SpO2 97%   BMI 27.90 kg/m²     Physical Exam  Constitutional:       General: She is not in acute distress. Appearance: Normal appearance. HENT:      Head: Normocephalic and atraumatic. Mouth/Throat:      Mouth: Mucous membranes are moist.      Pharynx: Oropharynx is clear. No oropharyngeal exudate or posterior oropharyngeal erythema.    Eyes: Extraocular Movements: Extraocular movements intact. Conjunctiva/sclera: Conjunctivae normal.      Pupils: Pupils are equal, round, and reactive to light. Neck:      Vascular: No carotid bruit. Cardiovascular:      Rate and Rhythm: Normal rate and regular rhythm. Pulses: Normal pulses. Heart sounds: Normal heart sounds. Pulmonary:      Effort: Pulmonary effort is normal.      Breath sounds: Normal breath sounds. Abdominal:      General: Bowel sounds are normal. There is no distension. Palpations: Abdomen is soft. Tenderness: There is abdominal tenderness (mild right sided). There is no guarding or rebound. Musculoskeletal:         General: Normal range of motion. Cervical back: No tenderness. Right lower leg: No edema. Left lower leg: No edema. Lymphadenopathy:      Cervical: No cervical adenopathy. Skin:     General: Skin is warm. Neurological:      Mental Status: She is alert and oriented to person, place, and time.       Gait: Gait normal.      Deep Tendon Reflexes: Reflexes normal.   Psychiatric:         Behavior: Behavior normal.

## 2021-06-22 NOTE — PROGRESS NOTES
I have copied the provider's check out note here and have reviewed these items with the patient today:  Check-out Note: Labs     Needs mammogram, pls give [facility] info. [facility]     F/U in 3 months or sooner with acute concerns. I have printed AVS and reviewed it with patient today. Patient verbalized understanding.  Ubaldo Sun RN

## 2021-06-23 ENCOUNTER — HOSPITAL ENCOUNTER (OUTPATIENT)
Dept: LAB | Age: 46
Discharge: HOME | End: 2021-06-23

## 2021-06-23 DIAGNOSIS — I10 ESSENTIAL HYPERTENSION: ICD-10-CM

## 2021-06-23 LAB
ALBUMIN SERPL-MCNC: 4.1 G/DL (ref 3.5–5)
ALBUMIN/GLOB SERPL: 1.2 {RATIO} (ref 1.1–2.2)
ALP SERPL-CCNC: 117 U/L (ref 45–117)
ALT SERPL-CCNC: 33 U/L (ref 12–78)
ANION GAP SERPL CALC-SCNC: 9 MMOL/L (ref 5–15)
AST SERPL-CCNC: 20 U/L (ref 15–37)
BILIRUB SERPL-MCNC: 0.6 MG/DL (ref 0.2–1)
BUN SERPL-MCNC: 13 MG/DL (ref 6–20)
BUN/CREAT SERPL: 22 (ref 12–20)
CALCIUM SERPL-MCNC: 9.2 MG/DL (ref 8.5–10.1)
CHLORIDE SERPL-SCNC: 104 MMOL/L (ref 97–108)
CHOLEST SERPL-MCNC: 212 MG/DL
CO2 SERPL-SCNC: 25 MMOL/L (ref 21–32)
COMMENT, HOLDF: NORMAL
CREAT SERPL-MCNC: 0.58 MG/DL (ref 0.55–1.02)
EST. AVERAGE GLUCOSE BLD GHB EST-MCNC: 108 MG/DL
GLOBULIN SER CALC-MCNC: 3.4 G/DL (ref 2–4)
GLUCOSE SERPL-MCNC: 110 MG/DL (ref 65–100)
HBA1C MFR BLD: 5.4 % (ref 4–5.6)
HCV AB SERPL QL IA: NONREACTIVE
HCV COMMENT,HCGAC: NORMAL
HDLC SERPL-MCNC: 46 MG/DL
HDLC SERPL: 4.6 {RATIO} (ref 0–5)
LDLC SERPL CALC-MCNC: 122.8 MG/DL (ref 0–100)
POTASSIUM SERPL-SCNC: 3.7 MMOL/L (ref 3.5–5.1)
PROT SERPL-MCNC: 7.5 G/DL (ref 6.4–8.2)
SAMPLES BEING HELD,HOLD: NORMAL
SODIUM SERPL-SCNC: 138 MMOL/L (ref 136–145)
TRIGL SERPL-MCNC: 216 MG/DL (ref ?–150)
VLDLC SERPL CALC-MCNC: 43.2 MG/DL

## 2021-06-23 NOTE — PROGRESS NOTES
Please let this patient know that she is not diabetic. Her hepatis C screening was negative. Her liver and kidney function look good. Her cholesterol is slightly elevated, but not enough to need medication. She should focus on healthy eating (avoiding sweets, fried foods) and getting physical activity. We'll recheck in about a year. Thanks.

## 2021-07-12 ENCOUNTER — OFFICE VISIT (OUTPATIENT)
Dept: FAMILY MEDICINE CLINIC | Age: 46
End: 2021-07-12

## 2021-07-12 VITALS
OXYGEN SATURATION: 97 % | BODY MASS INDEX: 27.49 KG/M2 | DIASTOLIC BLOOD PRESSURE: 88 MMHG | TEMPERATURE: 98.3 F | HEART RATE: 88 BPM | WEIGHT: 145.6 LBS | HEIGHT: 61 IN | SYSTOLIC BLOOD PRESSURE: 145 MMHG

## 2021-07-12 DIAGNOSIS — G89.29 CHRONIC RIGHT SHOULDER PAIN: Primary | ICD-10-CM

## 2021-07-12 DIAGNOSIS — S29.012A MUSCLE STRAIN OF RIGHT UPPER BACK, INITIAL ENCOUNTER: ICD-10-CM

## 2021-07-12 DIAGNOSIS — M25.511 CHRONIC RIGHT SHOULDER PAIN: Primary | ICD-10-CM

## 2021-07-12 PROCEDURE — 99213 OFFICE O/P EST LOW 20 MIN: CPT | Performed by: FAMILY MEDICINE

## 2021-07-12 NOTE — PROGRESS NOTES
Coordination of Care  1. Have you been to the ER, urgent care clinic since your last visit? Hospitalized since your last visit? No    2. Have you seen or consulted any other health care providers outside of the [address] [place] [address] since your last visit? Include any pap smears or colon screening. No    Does the patient need refills? NO    Learning Assessment Complete?  yes  Depression Screening complete in the past 12 months? yes

## 2021-07-12 NOTE — PROGRESS NOTES
I have copied the provider's check out note here and have reviewed these items with the patient today: Check-out Note: Can purchase Naproxen 500mg and Cold/Hot cream over the counter and use twice a day. Rest shoulder/arm as much as possible. Follow-up in one month if not improving. I have printed AVS and reviewed it with patient today. Patient verbalized understanding.  Dre Augustin RN

## 2021-07-12 NOTE — PATIENT INSTRUCTIONS
Antiinflammatorio:  NAPROXEN 500mg - tome ruth [employer] all santiago por Anita. Luego cuando la necesita para dolor. Crema para dolor:  \"COLD AND HOT pain relieving cream\"  tambien se llama \"Icy Hot\" o \"Cool and Heat\" - robbin altagracia shaw.   [address],[facility]

## 2021-07-12 NOTE — PROGRESS NOTES
Samuel Sheppard is a 40 y.o. female   Chief Complaint   Patient presents with    Shoulder Pain     pt c/o right shoulder pain x 4 days         ASSESSMENT AND PLAN:    1. Chronic right shoulder pain  Nonspecific location. Repetitive use. NSAIDs (naproxen 500mg BID for 2 weeks, then BID PRN) and topical Icy-Hot for symptomatic relief. Relative rest.  RTC if symptoms do not improve in one month. 2. Muscle strain of right upper back, initial encounter  Teres major involvement. Treatment JULIETA. SUBJECTIVE:    HPI:  Samuel Sheppard is a 40 y.o. female who presents with 4 days of R shoulder/upper back/side pain. She has had shoulder pain in the past. The focus of this pain is more just below her axilla. It is aggrevated by work. 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 She has taken 3 days off of work already because of the pain. When she wakes up in the morning the pain is bad, and then with activity. She takes tylenol which helps, but the pain always comes back. Her arm feels heavy but she has full ROM. She denies numbness/tingling. No fevers. Review of Systems   Constitutional: Negative for fever and malaise/fatigue. Eyes: Negative for blurred vision. Respiratory: Negative for cough and shortness of breath. Cardiovascular: Negative for chest pain, palpitations and leg swelling. Gastrointestinal: Negative for abdominal pain, constipation, diarrhea, nausea and vomiting. Musculoskeletal: Positive for back pain, joint pain and myalgias. Neurological: Negative for dizziness, tingling, sensory change and headaches.          BP (!) 145/88 (BP 1 Location: Right arm, BP Patient Position: Sitting, BP Cuff Size: Adult)   Pulse 88   Temp 98.3 °F (36.8 °C) (Temporal)   Ht 5' 1.06\" (1.551 m)   Wt 145 lb 9.6 oz (66 kg)   LMP 03/30/2021 Comment: Depoprovera injection  SpO2 97% BMI 27.45 kg/m²     Physical Exam  Constitutional:       General: She is not in acute distress. Appearance: Normal appearance. HENT:      Head: Normocephalic and atraumatic. Eyes:      Extraocular Movements: Extraocular movements intact. Pupils: Pupils are equal, round, and reactive to light. Musculoskeletal:         General: Tenderness (over teres major, subscapula, and bicipel groove) present. No swelling. Normal range of motion. Skin:     General: Skin is warm. Findings: No erythema. Neurological:      Mental Status: She is alert. Sensory: Sensation is intact. Motor: Motor function is intact.

## 2021-07-13 ENCOUNTER — TELEPHONE (OUTPATIENT)
Dept: FAMILY MEDICINE CLINIC | Age: 46
End: 2021-07-13

## 2021-07-13 DIAGNOSIS — I10 ESSENTIAL HYPERTENSION: ICD-10-CM

## 2021-07-13 DIAGNOSIS — R12 HEARTBURN: ICD-10-CM

## 2021-07-13 RX ORDER — ESOMEPRAZOLE MAGNESIUM 40 MG/1
40 CAPSULE, DELAYED RELEASE ORAL DAILY
Qty: 30 CAPSULE | Refills: 2 | Status: SHIPPED | OUTPATIENT
Start: 2021-07-13 | End: 2022-05-26 | Stop reason: SDUPTHER

## 2021-07-13 RX ORDER — LISINOPRIL AND HYDROCHLOROTHIAZIDE 12.5; 2 MG/1; MG/1
TABLET ORAL
Qty: 90 TABLET | Refills: 1 | Status: SHIPPED | OUTPATIENT
Start: 2021-07-13 | End: 2021-11-16 | Stop reason: SDUPTHER

## 2021-07-13 NOTE — PROGRESS NOTES
Gordon to the pt with the assistance of [facility] int. Tc to the pt. She was given her lab result message and recommendation's. The pt given healthy diet information over the phone and exercise recommendation's. The pt then stated she had been told yesterday by the Dr that she should take a few days off work and she forgot to ask for a work note, and she asked for her Lisinopril-HCTZ to be ordered. They had been ordered by the provider 06/22/21. She stated the Pharmacy said they did not have the rx, and to ask the Dr to send the rx to them. The pharmacy was called they had not gotten the rx's. They were re-ordered to the [facility] on [address] Routed this message to the provider.  Lord Michelle RN

## 2021-07-13 NOTE — TELEPHONE ENCOUNTER
Tc to the pt's Pharmacy. The pt stated she went to  her medication's and the Pharmacy stated they did not have them. The Pharmacy told this nurse they did not even have a file on the pt. The medications Lisinopril- HCTZ and Esomeprazole were re-ordered for the pt and sent to the [facility] on [address] as the pt requested. The address was verified and confirmed by the pt that is where she had gone and has been there before. The pt was encouraged to wait to  the medication's for 2 hrs.  Kristin Napier RN

## 2021-07-30 ENCOUNTER — IMMUNIZATION (OUTPATIENT)
Dept: FAMILY MEDICINE CLINIC | Age: 46
End: 2021-07-30

## 2021-08-20 ENCOUNTER — IMMUNIZATION (OUTPATIENT)
Dept: FAMILY MEDICINE CLINIC | Age: 46
End: 2021-08-20

## 2021-08-20 DIAGNOSIS — Z23 ENCOUNTER FOR IMMUNIZATION: Primary | ICD-10-CM

## 2021-08-20 PROCEDURE — 0002A COVID-19, MRNA, LNP-S, PF, 30MCG/0.3ML DOSE(PFIZER): CPT

## 2021-08-20 PROCEDURE — 91300 COVID-19, MRNA, LNP-S, PF, 30MCG/0.3ML DOSE(PFIZER): CPT

## 2021-11-16 ENCOUNTER — OFFICE VISIT (OUTPATIENT)
Dept: FAMILY MEDICINE CLINIC | Age: 46
End: 2021-11-16

## 2021-11-16 VITALS
SYSTOLIC BLOOD PRESSURE: 152 MMHG | DIASTOLIC BLOOD PRESSURE: 93 MMHG | HEART RATE: 82 BPM | OXYGEN SATURATION: 98 % | HEIGHT: 61 IN | WEIGHT: 142 LBS | BODY MASS INDEX: 26.81 KG/M2 | TEMPERATURE: 97.8 F

## 2021-11-16 DIAGNOSIS — I10 ESSENTIAL HYPERTENSION: ICD-10-CM

## 2021-11-16 DIAGNOSIS — R12 HEARTBURN: ICD-10-CM

## 2021-11-16 DIAGNOSIS — M79.602 DIFFUSE PAIN IN LEFT UPPER EXTREMITY: ICD-10-CM

## 2021-11-16 DIAGNOSIS — K59.00 CONSTIPATION, UNSPECIFIED CONSTIPATION TYPE: Primary | ICD-10-CM

## 2021-11-16 PROCEDURE — 99214 OFFICE O/P EST MOD 30 MIN: CPT | Performed by: FAMILY MEDICINE

## 2021-11-16 RX ORDER — PREDNISONE 20 MG/1
40 TABLET ORAL
Qty: 10 TABLET | Refills: 0 | Status: SHIPPED | OUTPATIENT
Start: 2021-11-16 | End: 2021-11-21

## 2021-11-16 RX ORDER — POLYETHYLENE GLYCOL 3350 17 G/17G
17 POWDER, FOR SOLUTION ORAL DAILY
Qty: 235 G | Refills: 0 | Status: SHIPPED | OUTPATIENT
Start: 2021-11-16

## 2021-11-16 RX ORDER — LISINOPRIL AND HYDROCHLOROTHIAZIDE 12.5; 2 MG/1; MG/1
TABLET ORAL
Qty: 90 TABLET | Refills: 1 | Status: SHIPPED | OUTPATIENT
Start: 2021-11-16 | End: 2022-05-26 | Stop reason: SDUPTHER

## 2021-11-16 NOTE — PROGRESS NOTES
Aime Fitzpatrick is a 40 y.o. female   Chief Complaint   Patient presents with    Hypertension     and heartburn f/up    Immunization/Injection     pt states the last covid-19 dose gave her skin problems, pt is concerned    Medication Refill         ASSESSMENT AND PLAN:    1. Essential hypertension  Uncontrolled. Pt does not wish to change meds at this visit. If elevated at followup, will make adjustments. - lisinopril-hydroCHLOROthiazide (PRINZIDE, ZESTORETIC) 20-12.5 mg per tablet; TAKE 1 TABLET BY MOUTH DAILY FOR BLOOD PRESSURE  Dispense: 90 Tablet; Refill: 1    2. Constipation, unspecified constipation type  Did not respond to increased fiber/hydration. Will start miralax  - polyethylene glycol (MIRALAX) 17 gram/dose powder; Take 17 g by mouth daily. Indications: constipation  Dispense: 235 g; Refill: 0    3. Diffuse pain in left upper extremity  Started after developing a hematoma after her second covid vaccine, but could be cervicogenic. NO improvement with NSAIDs. Will try a steroid burst.  - predniSONE (DELTASONE) 20 mg tablet; Take 40 mg by mouth daily (with breakfast) for 5 days. Dispense: 10 Tablet; Refill: 0    4. Heartburn  Take esomeprazole daily for 2 weeks instead of PRN. SUBJECTIVE:    HPI:  Aime Fitzpatrick is a 40 y.o. female who presents due to pain of her left arm. She notes that after her 2nd COVID-19 dose she developed a large hematoma over her tricep. She iced it and it resolved, but the pain persisted. It is between her shoulder and he elbow, but there is no joint involvement. She also has neck and L upper trap tightness/tenderness. She has been taking so.many. NSAIDs and has developed some epigastric discomfort so she doesn't wish to continue taking them. She has nexium at home but she only takes it when she feels acid reflux. She also reports constipation. She eats a lot of fruits and vegetables. She drinks a lot of water.     She is compliant with her BP meds. Review of Systems   Constitutional: Negative for fever and malaise/fatigue. Eyes: Negative for blurred vision. Respiratory: Negative for cough and shortness of breath. Cardiovascular: Negative for chest pain, palpitations and leg swelling. Gastrointestinal: Positive for abdominal pain (epigastric) and heartburn. Negative for constipation, diarrhea, nausea and vomiting. Musculoskeletal: Positive for myalgias and neck pain. Neurological: Negative for dizziness, tingling and headaches. BP (!) 152/93 (BP 1 Location: Left arm, BP Patient Position: Sitting)   Pulse 82   Temp 97.8 °F (36.6 °C) (Temporal)   Ht 5' 1.06\" (1.551 m)   Wt 142 lb (64.4 kg)   LMP 03/25/2021   SpO2 98%   BMI 26.78 kg/m²     Physical Exam  Constitutional:       General: She is not in acute distress. Appearance: Normal appearance. HENT:      Head: Normocephalic and atraumatic. Mouth/Throat:      Mouth: Mucous membranes are moist.      Pharynx: Oropharynx is clear. No oropharyngeal exudate or posterior oropharyngeal erythema. Eyes:      Extraocular Movements: Extraocular movements intact. Conjunctiva/sclera: Conjunctivae normal.      Pupils: Pupils are equal, round, and reactive to light. Neck:      Vascular: No carotid bruit. Cardiovascular:      Rate and Rhythm: Normal rate and regular rhythm. Pulses: Normal pulses. Heart sounds: Normal heart sounds. Pulmonary:      Effort: Pulmonary effort is normal.      Breath sounds: Normal breath sounds. Abdominal:      General: Bowel sounds are normal. There is no distension. Palpations: Abdomen is soft. Tenderness: There is no abdominal tenderness. Musculoskeletal:         General: Normal range of motion. Right shoulder: Normal.      Left shoulder: Normal.      Right upper arm: Normal.      Left upper arm: Tenderness present. No swelling or edema.       Right elbow: Normal.      Left elbow: Normal. Cervical back: No tenderness. Muscular tenderness present. Right lower leg: No edema. Left lower leg: No edema. Lymphadenopathy:      Cervical: No cervical adenopathy. Skin:     General: Skin is warm. Findings: No bruising or erythema. Comments: Has bandaid tanlines over LUE. Neurological:      Mental Status: She is alert and oriented to person, place, and time.       Gait: Gait normal.      Deep Tendon Reflexes: Reflexes normal.   Psychiatric:         Behavior: Behavior normal.

## 2021-11-16 NOTE — PROGRESS NOTES
Coordination of Care  1. Have you been to the ER, urgent care clinic since your last visit? Hospitalized since your last visit? No    2. Have you seen or consulted any other health care providers outside of the [address] [place] [address] since your last visit? Include any pap smears or colon screening. No    Does the patient need refills? YES    Learning Assessment Complete?  yes  Depression Screening complete in the past 12 months? yes

## 2021-11-16 NOTE — PROGRESS NOTES
An After Visit Summary was printed and given to the patient. GoodRx provided to patient for needed medications. Instructed patient on how to use the coupon/card. Patient made an appointment for two week follow up.  April [address] Drake Bernstein, RN

## 2021-12-07 ENCOUNTER — OFFICE VISIT (OUTPATIENT)
Dept: FAMILY MEDICINE CLINIC | Age: 46
End: 2021-12-07

## 2021-12-07 ENCOUNTER — HOSPITAL ENCOUNTER (OUTPATIENT)
Dept: LAB | Age: 46
Discharge: HOME | End: 2021-12-07

## 2021-12-07 VITALS
OXYGEN SATURATION: 98 % | TEMPERATURE: 97.3 F | WEIGHT: 144 LBS | HEART RATE: 79 BPM | DIASTOLIC BLOOD PRESSURE: 75 MMHG | HEIGHT: 61 IN | SYSTOLIC BLOOD PRESSURE: 134 MMHG | BODY MASS INDEX: 27.19 KG/M2

## 2021-12-07 DIAGNOSIS — Z12.4 CERVICAL CANCER SCREENING: ICD-10-CM

## 2021-12-07 DIAGNOSIS — G47.00 INSOMNIA, UNSPECIFIED TYPE: ICD-10-CM

## 2021-12-07 DIAGNOSIS — I10 ESSENTIAL HYPERTENSION: Primary | ICD-10-CM

## 2021-12-07 PROCEDURE — 99214 OFFICE O/P EST MOD 30 MIN: CPT | Performed by: FAMILY MEDICINE

## 2021-12-07 PROCEDURE — 88175 CYTOPATH C/V AUTO FLUID REDO: CPT

## 2021-12-07 PROCEDURE — 87624 HPV HI-RISK TYP POOLED RSLT: CPT

## 2021-12-07 NOTE — PROGRESS NOTES
Aime Barone [place] Jacobson is a 40 y.o. female   Chief Complaint   Patient presents with    Hypertension     f/u         ASSESSMENT AND PLAN:    1. Essential hypertension  Improved with lifestyle management. No need to adjust medications at this time. Continue zestoretic 20/12.5 daily, daily physical activity, and healthy dietary choices    2. Cervical cancer screening  Specimen obtained and sent to lab for cotesting. Followup per ASCCP guidelines. - PAP IG, APTIMA HPV AND RFX 16/18,45 ([id number]); Future    3. Insomnia, unspecified type  Melatonin recommended. Patient will purchase OTC. SUBJECTIVE:    HPI:  Marie Vasquez is a 40 y.o. female who presents for followup. At her last two visits her BP was above goal and we discussed adding a medication but she preferred to make lifestyle changes and she came through. She has been walking more and making healthier food choices. Today her blood pressure is better. With her improved diet her constipation also improved and she hasn't had to use the miralax. Her arm pain resolved after the steroid burst.  She still has a little heartburn but it is better than before. She is wondering about treatments for insomnia. She doesn't want anything too strong. She falls asleep okay but has trouble staying asleep. She wakes up and her mind starts racing and she can't fall back asleep. They next morning she feels groggy. Review of Systems   Constitutional: Negative. Respiratory: Negative. Cardiovascular: Negative. Gastrointestinal: Positive for heartburn. Neurological: Negative. Psychiatric/Behavioral: The patient has insomnia. /75 (BP 1 Location: Right arm, BP Patient Position: Sitting)   Pulse 79   Temp 97.3 °F (36.3 °C) (Temporal)   Ht 5' 1.06\" (1.551 m)   Wt 144 lb (65.3 kg)   SpO2 98%   BMI 27.15 kg/m²     Physical Exam  Constitutional:       General: She is not in acute distress.      Appearance: Normal appearance. HENT:      Head: Normocephalic and atraumatic. Neck:      Vascular: No carotid bruit. Cardiovascular:      Rate and Rhythm: Normal rate and regular rhythm. Pulses: Normal pulses. Heart sounds: Normal heart sounds. Pulmonary:      Effort: Pulmonary effort is normal.      Breath sounds: Normal breath sounds. Abdominal:      General: Bowel sounds are normal. There is no distension. Palpations: Abdomen is soft. Tenderness: There is no abdominal tenderness. Genitourinary:     General: Normal vulva. Vagina: Normal.      Cervix: Normal.   Musculoskeletal:      Cervical back: No tenderness. Lymphadenopathy:      Cervical: No cervical adenopathy. Neurological:      Mental Status: She is alert and oriented to person, place, and time.       Gait: Gait normal.      Deep Tendon Reflexes: Reflexes normal.   Psychiatric:         Behavior: Behavior normal.

## 2021-12-07 NOTE — PROGRESS NOTES
Coordination of Care  1. Have you been to the ER, urgent care clinic since your last visit? Hospitalized since your last visit? No    2. Have you seen or consulted any other health care providers outside of the [address] [place] [address] since your last visit? Include any pap smears or colon screening. No    Does the patient need refills? no    Learning Assessment Complete?  yes  Depression Screening complete in the past 12 months? yes

## 2022-04-28 NOTE — MR AVS SNAPSHOT
Tasia Pulse 
 
 
 [address] [facility] [address] [place] [address] 
[phone] Patient: Jessica Smith MRN: [id number] UQ Visit Information Kwesi Huff Personal Médico Departamento Teléfono del Dep. Número de visita 3/8/2018 10:45 AM Marcus Rodriguez [address], SÁNCHEZ Snow [id number] Follow-up Instructions Return in about 6 months (around 2018), or if symptoms worsen or fail to improve. Upcoming Health Maintenance Date Due DTaP/Tdap/Td series (1 - Tdap) 1997 PAP AKA CERVICAL CYTOLOGY 1997 Influenza Age 5 to Adult 2017 Alergias  Review Complete El: 2017 Por: Dariel Paulson LPN A partir del:  3/8/2018 No Known Allergies Vacunas actuales Ramond [place] Vivian Stallion Influenza Vaccine (Quad) PF 10/18/2016 Influenza Vaccine PF 2014 No revisadas esta visita You Were Diagnosed With   
  
 Gisselle Kazakh Acute pain of right shoulder    -  Primary ICD-10-CM: M25.511 ICD-9-CM: 719.41 Partes vitales PS Pulso Temperatura [place] ( percentil de crecimiento) Peso (percentil de crecimiento) BMI (IMC)  
 130/78 (BP 1 Location: Left arm, BP Patient Position: Sitting) 87 98.8 °F (37.1 °C) (Oral) 5' 1.02\" (1.55 m) 147 lb (66.7 kg) 27.75 kg/m2 Estado obstétrico Estatus de tabaquísmo Injection Never Smoker Historial de signos vitales BMI and BSA Data Body Mass Index Body Surface Area  
 27.75 kg/m 2 1.69 m 2 [facility] Name Phone [place] [address], [address] [phone] Beach lista de medicamentos actualizada James Howe actualizada 3/8/18 11:48 AM.  Sonal Fulling use beach lista de medicamentos más reciente. Blood Pressure Test Kit-Medium Kit Can you pick out a reasonably-priced cuff that is likely to be accurate for her? Thanks. Also needs to know how to use it.  
  
 carbamide peroxide 6.5 % otic solution También conocido belkis:  Ramu Betts Administer 5 Drops into each ear two (2) times a day. lisinopril-hydroCHLOROthiazide 20-12.5 mg per tablet También conocido belkis:  Hong Primus Take 1 Tab by mouth daily. For blood pressure. loratadine 10 mg tablet También conocido belkis:  Lajune Hawi Take 1 Tab by mouth daily. Hema cada santiago para alergias  
  
 methocarbamol 500 mg tablet También conocido belkis:  ROBAXIN Take 1 Tab by mouth three (3) times daily. [place] 1 pastilla 3 veces al santiago si necissita  
  
 naproxen 500 mg tablet También conocido belkis:  NAPROSYN Take 1 Tab by mouth two (2) times daily (with meals). [place] 1 pastilla 2 vecs al santiago Recetas Enviado a la [place] Refills  
 methocarbamol (ROBAXIN) 500 mg tablet 0 Sig: Take 1 Tab by mouth three (3) times daily. [place] 1 pastilla 3 veces al santiago si necissita Class: Normal  
 Pharmacy: [address] Ph #: [phone] Route: Oral  
 naproxen (NAPROSYN) 500 mg tablet 0 Sig: Take 1 Tab by mouth two (2) times daily (with meals). [place] 1 pastilla 2 vecs al santiago Class: Normal  
 Pharmacy: [address] Ph #: [phone] Route: Oral  
 lisinopril-hydroCHLOROthiazide (PRINZIDE, ZESTORETIC) 20-12.5 mg per tablet 1 Sig: Take 1 Tab by mouth daily. For blood pressure. Class: Normal  
 Pharmacy: [address] Ph #: [phone] Route: Oral  
  
Instrucciones de seguimiento Return in about 6 months (around 9/8/2018), or if symptoms worsen or fail to improve. Instrucciones para el Paciente Dolor en el hombro: Instrucciones de cuidado - [ Shoulder Pain: Care Instructions ] Instrucciones de cuidado Puede lesionar beach hombro al usarlo demasiado brice Winner, belkis pescar o jugar béisbol. Puede suceder belkis parte del desgaste cotidiano por el envejecimiento. Las lesiones de hombro pueden tardar tiempo en sanar, bina beach hombro debería mejorar con [employer]. Beach médico podría recomendar un cabestrillo para descansar el hombro. Si se lesionó el hombro, robin vez necesite pruebas y [place]. La atención de seguimiento es ruth parte clave de beach tratamiento y seguridad. Asegúrese de hacer y acudir a todas las citas, y llame a beach médico si está teniendo problemas. También es ruth buena idea saber los resultados de los exámenes y mantener ruth lista de los medicamentos que danny. Cómo puede cuidarse en el hogar? · [place] [employer] analgésicos (medicamentos para el dolor) exactamente según las indicaciones. ¨ Si el médico le recetó analgésicos, tómelos según las indicaciones. ¨ Si no está tomando un analgésico recetado, pregúntele a beach médico si puede gee prakash de [employer]. ¨ No tome dos o más analgésicos al MG MIRAGE, a menos que el médico se lo haya indicado. Muchos analgésicos contienen acetaminofén, es decir, Tylenol. El exceso de acetaminofén (Tylenol) puede ser dañino. · Si beach médico le recomienda usar un cabestrillo, úselo belkis se le haya indicado. No se lo quite antes de que se lo indique el médico. 
· Aplíquese hielo o ruth compresa fría sobre la sg adolorida brice 10 a 20 minutos cada vez. Póngase un paño kaur entre el hielo y la piel. · Si no hay hinchazón, puede aplicar calor húmedo, ruth almohadilla térmica o un paño tibio sobre el hombro. Algunos médicos sugieren alternar CHUN Sahu y holly. · Descanse el hombro brice algunos días. Si beach médico lo recomienda, puede comenzar a ejercitar el hombro con suavidad, bina no levante nada pesado. Cuándo debe pedir ayuda? Llame al 911 en cualquier momento que considere que necesita atención de [place]. Por ejemplo, llame si: 
? · Siente dolor u opresión en el pecho. [place] podría ocurrir junto con: 
¨ Sudoración. ¨ Falta de aire. ¨ Náuseas o vómito. ¨ Dolor que se extiende del pecho al nate, la Analy, o hacia prakash o ambos hombros o [id number]. ¨ Mareos o aturdimiento. ¨ Pulso rápido o irregular. Después de llamar al 911, mastique 1 aspirina para adultos. Espere a la ambulancia. No trate de conducir usted mismo un automóvil. ? · Beach brazo o mano está frío o pálido, o cambia de color. ?Llame a beach médico ahora mismo o busque atención médica inmediata si: 
? · [address], tales belkis: ¨ Mayor Cherie Les, hinchazón, enrojecimiento o aumento de la temperatura en el hombro. ¨ Vetas rojizas que comienzan en ruth sg del hombro. ¨ Pus que supura de ruth sg del hombro. ¨ Ganglios linfáticos inflamados en el nate, las axilas o la silvano. Stephanie Mathew. ?Preste especial atención a los cambios en beach sandeep y asegúrese de comunicarse con beach médico si: 
? · No puede usar el hombro. ? · El hombro no mejora belkis se esperaba. Dónde puede encontrar más información en inglés? Paola Creed a http://pretty-pema.info/. Girma Adams H546 en la búsqueda para aprender más acerca de \"Dolor en el hombro: Instrucciones de cuidado - [ Shoulder Pain: Care Instructions ]. \" 
Revisado: 21 marzo, 2017 Versión del contenido: 11.4 © 7680-6099 Healthwise, Incorporated. Las instrucciones de cuidado fueron adaptadas bajo licencia por Good Help Connections (which disclaims liability or warranty for this information). Si usted tiene [place] afección médica o sobre estas instrucciones, siempre pregunte a beach profesional de sandeep. Healthwise, Incorporated niega toda garantía o responsabilidad por beach uso de esta información. Estiramientos de hombros: Ejercicios - [ Shoulder Stretches: Exercises ] Instrucciones de cuidado Aquí se presentan algunos ejemplos de ejercicios para el hombro. Empiece cada ejercicio lentamente. Reduzca la intensidad del ejercicio si Haley Salt a tener dolor. Logan médico o fisioterapeuta le dirán cuándo puede comenzar con estos ejercicios y cuáles funcionarán mejor para usted. Cómo hacer los ejercicios Estiramiento del hombro 1. Párese en un olga de dipika y coloque un brazo contra el olga de la [place]. El codo debe estar un poco más alto que el hombro. 2. Relaje los hombros a medida que se inclina hacia adelante, permitiendo que se estiren los músculos del pecho y del hombro. También puede girar logan cuerpo ligeramente alejándolo de logan brazo para estirar los músculos aún [place]. 3. Mantenga la posición entre 15 y 27 segundos. 4. Repita de 2 a 4 veces con cada brazo. Estiramiento del hombro y del pecho 1. Estiramiento del hombro y del pecho 2. Estando sentado, relaje la parte superior de logan cuerpo de modo que se deje caer suavemente en logan silla. 3. A medida que inhala, enderece la espalda y maria elena los [employer]. 4. Jale suavemente los omóplatos hacia atrás y [place]. 5. Mantenga la posición brice 15 a 30 segundos mientras respira normalmente. 6. Repita de 2 a 4 veces. Estiramiento sobre la [place] 1. [place] ambos brazos por encima de la radha. 2. Mantenga la posición entre 15 y 27 segundos. 3. Repita de 2 a 4 veces. La atención de seguimiento es ruth parte clave de logan tratamiento y seguridad. Asegúrese de hacer y acudir a todas las citas, y llame a logan médico si está teniendo problemas. También es ruth buena idea saber los resultados de los exámenes y mantener ruth lista de los medicamentos que danny. Dónde puede encontrar más información en inglés? Gisselle Camarillo a http://pretty-pema.info/. Opal Flanagan W605 en la búsqueda para aprender más acerca de \"Estiramientos de hombros: Ejercicios - [ Shoulder Stretches: Exercises ]. \" 
Revisado: 21 marzo, 2017 Versión del contenido: 11.4 © 4533-3716 Healthwise, Incorporated. Las instrucciones de cuidado fueron adaptadas bajo licencia por Good Help Connections (which disclaims liability or warranty for this information). Si usted tiene [place] afección médica o sobre estas instrucciones, siempre pregunte a logan profesional de sandeep. Healthwise, Incorporated niega toda garantía o responsabilidad por logan uso de esta información. Introducing [facility] introduce portal paciente MyChart . Ahora se puede acceder a partes de logan expediente médico, enviar por correo electrónico la oficina de logan médico y solicitar renovaciones de medicamentos en línea. En logan navegador de Internet , Faraz Teague a [email or web address] Bonita clic en el usuario por Jai Oseguera? Josafat Anes clic aquí en la sesión Elissa [place]. Verá la página de registro [place]. Ingrese logan código de [employer] robin y belkis aparece a continuación. Usted no tendrá que [employer] código después de clarisa completado el proceso de registro . Si usted no se inscribe antes de la fecha de caducidad , debe solicitar un nuevo código. · MyChart Código de acceso : [id number] Expires: 6/6/2018 11:48 AM 
 
Ingresa los últimos cuatro dígitos de logan Número de Seguro Social ( xxxx ) y fecha de nacimiento ( dd / mm / aaaa ) belkis se indica y bonita clic en Enviar. Felisha será llevado a la siguiente página de registro . Crear un ID MyChart . Esta será logan ID de inicio de sesión de MyChart y no puede ser [place] , por lo que pensar en ruth que es Lauree Mean y fácil de recordar . Crear ruth contraseña MyChart . Usted puede cambiar logan contraseña en cualquier momento . Ingrese logan Password Reset de preguntas y Eric . [place] se puede utilizar en un momento posterior si usted olvida logan contraseña.  
Introduzca logan dirección de correo electrónico . Yakov Munoz recibirá ruth notificación por correo electrónico cuando la nueva información está disponible en MyChart . Sara savage en Registrarse. Rikki Mendez fabien y descargar porciones de logan expediente médico. 
Rick hussein en el enlace de descarga del menú Resumen para descargar ruth copia portátil de logan información médica . Si tiene [employer] , por favor visite la sección de preguntas frecuentes del sitio web MyChart . Recuerde, MyChart NO es que se utilizará para las necesidades urgentes. Para emergencias médicas , llame al 911 . Ahora disponible en logan iPhone y Android ! Por favor proporcione hortensia resumen de la documentación de cuidado a logan próximo proveedor. Your primary care clinician is listed as NONE. If you have any questions after today's visit, please call [phone]. Single

## 2022-05-26 ENCOUNTER — OFFICE VISIT (OUTPATIENT)
Dept: FAMILY MEDICINE CLINIC | Age: 47
End: 2022-05-26

## 2022-05-26 VITALS
TEMPERATURE: 97.5 F | BODY MASS INDEX: 27 KG/M2 | WEIGHT: 143 LBS | HEIGHT: 61 IN | SYSTOLIC BLOOD PRESSURE: 139 MMHG | OXYGEN SATURATION: 96 % | DIASTOLIC BLOOD PRESSURE: 77 MMHG | HEART RATE: 82 BPM

## 2022-05-26 DIAGNOSIS — R12 HEARTBURN: ICD-10-CM

## 2022-05-26 DIAGNOSIS — G89.29 CHRONIC RIGHT SHOULDER PAIN: ICD-10-CM

## 2022-05-26 DIAGNOSIS — H61.21 IMPACTED CERUMEN OF RIGHT EAR: Primary | ICD-10-CM

## 2022-05-26 DIAGNOSIS — I10 ESSENTIAL HYPERTENSION: ICD-10-CM

## 2022-05-26 DIAGNOSIS — M25.511 CHRONIC RIGHT SHOULDER PAIN: ICD-10-CM

## 2022-05-26 PROCEDURE — 99214 OFFICE O/P EST MOD 30 MIN: CPT | Performed by: NURSE PRACTITIONER

## 2022-05-26 RX ORDER — LISINOPRIL AND HYDROCHLOROTHIAZIDE 12.5; 2 MG/1; MG/1
TABLET ORAL
Qty: 90 TABLET | Refills: 1 | Status: SHIPPED | OUTPATIENT
Start: 2022-05-26

## 2022-05-26 RX ORDER — LISINOPRIL AND HYDROCHLOROTHIAZIDE 12.5; 2 MG/1; MG/1
TABLET ORAL
Qty: 90 TABLET | Refills: 1 | Status: SHIPPED | OUTPATIENT
Start: 2022-05-26 | End: 2022-05-26 | Stop reason: SDUPTHER

## 2022-05-26 RX ORDER — IBUPROFEN 600 MG/1
600 TABLET ORAL
Qty: 60 TABLET | Refills: 0 | Status: SHIPPED | OUTPATIENT
Start: 2022-05-26

## 2022-05-26 RX ORDER — ESOMEPRAZOLE MAGNESIUM 40 MG/1
40 CAPSULE, DELAYED RELEASE ORAL DAILY
Qty: 30 CAPSULE | Refills: 2 | Status: SHIPPED | OUTPATIENT
Start: 2022-05-26

## 2022-05-26 NOTE — PATIENT INSTRUCTIONS
Tapón de cerumen: Instrucciones de cuidado  Earwax Blockage: Care Instructions  Instrucciones de cuidado     El cerumen es ruth sustancia natural que protege el canal [place]. En general, el cerumen drena de los oídos y no causa problemas. A veces, se acumula y se endurece. El tapón de cerumen (también llamado impactación de cerumen) puede causar algo de pérdida de la audición y dolor. Cuando el cerumen está muy compactado necesitará que un médico lo extraiga. La atención de seguimiento es ruth parte clave de logan tratamiento y seguridad. Asegúrese de hacer y acudir a todas las citas, y llame a logan médico si está teniendo problemas. También es ruth buena idea saber los resultados de [place] exámenes y mantener ruth lista de los medicamentos que danny. ¿Cómo puede cuidarse en el hogar? · No trate de extraer el cerumen con hisopos de algodón, con los dedos o con otros objetos. [place] puede empeorar la obstrucción y dañar el tímpano. · Si logan médico le recomienda que trate de extraerse el cerumen en casa:  ? [place] y afloje el cerumen con aceite mineral tibio. También puede tratar de usar peróxido de hidrógeno (agua oxigenada) mezclado con ruth cantidad igual de agua a temperatura ambiente. Ponga en el oído 2 gotas del líquido calentado a la temperatura del cuerpo, 2 veces al día por un cary de 5 dennis. ? Rosita Lang que la cera está suelta y [place], por lo general todo lo que se necesita para sacarla del conducto auditivo externo es ruth Aniyah Mitts y tibia. Dirija el agua hacia el oído y luego incline la radha para permitir que drene el cerumen. Seque saniya el oído con un secador de pelo a baja temperatura. Sostenga el secador a varias pulgadas del oído. ? Si el aceite mineral tibio y la ducha no funcionan, utilice un ablandador de cera de venta medardo seguido por un lavado suave con [place] de oído todas las noches brice ruth o [place].  Asegúrese de que la solución de lavado esté a la temperatura corporal. Los líquidos fríos o calientes en el oído pueden ocasionar mareos. ¿Cuándo debe pedir ayuda? Llame a logan médico ahora mismo o busque atención médica inmediata si:    · Le sale pus o sofia del oído.     · Tiene un zumbido en el oído o lo siente lleno.     · Tiene pérdida de la audición. Preste especial atención a los cambios en logan sandeep y asegúrese de comunicarse con logan médico si:    · Tiene dolor o se le ha reducido la audición después de 1 semana de tratamiento casero.     · Tiene algún síntoma nuevo, belkis náuseas o problemas de equilibrio. ¿Dónde puede encontrar más información en inglés? Vaya a http://www.gray.[employer] [place]/  Fredis [id number] en la búsqueda para aprender más acerca de \"Tapón de cerumen: Instrucciones de cuidado. \"  Revisado: 1 julio, 9209               LSQQSIG del contenido: 13.2  © 6440-1646 Healthwise, Incorporated. Las instrucciones de cuidado fueron adaptadas bajo licencia por Good Help Connections (which disclaims liability or warranty for this information). Si usted tiene [place] afección médica o sobre estas instrucciones, siempre pregunte a logan profesional de sandeep. [employer] niega toda garantía o responsabilidad por logan uso de esta información.

## 2022-05-26 NOTE — PROGRESS NOTES
Aime Orourke seen at d/c, full name and  verified, given After visit Summary and reviewed today's visit with patient along with instructions on when it is recommended to come back. I reviewed the medication list with the patient to ensure she knows how to and when to take her medications. Side effects, mechanisms of action and medication compliance were reiterated to ensure proper understanding. I have reviewed the provider's instructions with the patient, answering all questions to her satisfaction. Patient verbalized understanding.   Jazmine Hebert RN

## 2022-05-26 NOTE — PROGRESS NOTES
Coordination of Care  1. Have you been to the ER, urgent care clinic since your last visit? Hospitalized since your last visit? No    2. Have you seen or consulted any other health care providers outside of the [address] [place] [address] since your last visit? Include any pap smears or colon screening. No    Does the patient need refills? YES    Learning Assessment Complete?  yes  Depression Screening complete in the past 12 months? yes

## 2022-05-26 NOTE — PROGRESS NOTES
2022 : Madhavi Orourke (: 1975) is a 56 y.o. female, established patient, here for evaluation of the following chief complaint(s):  Arm Pain (right arm pain) and Medication Refill     ASSESSMENT/PLAN:  Below is the assessment and plan developed based on review of pertinent history, physical exam, labs, studies, and medications. 1. Impacted cerumen of right ear  2. Essential hypertension  -     lisinopril-hydroCHLOROthiazide (PRINZIDE, ZESTORETIC) 20-12.5 mg per tablet; TAKE 1 TABLET BY MOUTH DAILY FOR BLOOD PRESSURE, Normal, Disp-90 Tablet, R-1  3. Heartburn  -     esomeprazole (NEXIUM) 40 mg capsule; Take 1 Capsule by mouth daily. , Normal, Disp-30 Capsule, R-2  4. Chronic right shoulder pain  -     ibuprofen (MOTRIN) 600 mg tablet; Take 1 Tablet by mouth every six (6) hours as needed for Pain., Normal, Disp-60 Tablet, R-0    Return for F2F LK ear lavage about 1 week. SUBJECTIVE/OBJECTIVE:  HPI She takes up to 600 mg ibuprofen a day for the pain. Right shoulder pain. After the vaccine for the coronavirus she started having pain in the left arm. The right arm/shoulder feels like muscle tiredness. Also right ear pain. Hurts when the weather is cold. Given something for pain of the ear - this is not new. No results found for any visits on 22. Review of Systems: Negative for: fever, chest pain, shortness of breath, leg swelling. Social History:  reports that she has never smoked. She has never used smokeless tobacco. She reports that she does not drink alcohol and does not use drugs. Current Medications:   Current Outpatient Medications   Medication Sig    lisinopril-hydroCHLOROthiazide (PRINZIDE, ZESTORETIC) 20-12.5 mg per tablet TAKE 1 TABLET BY MOUTH DAILY FOR BLOOD PRESSURE    esomeprazole (NEXIUM) 40 mg capsule Take 1 Capsule by mouth daily.  ibuprofen (MOTRIN) 600 mg tablet Take 1 Tablet by mouth every six (6) hours as needed for Pain.     polyethylene glycol (MIRALAX) 17 gram/dose powder Take 17 g by mouth daily. Indications: constipation (Patient not taking: Reported on 5/26/2022)     Physical Examination: No LMP recorded. Patient is perimenopausal.  Blood pressure 139/77, pulse 82, temperature 97.5 °F (36.4 °C), temperature source Temporal, height 5' 0.63\" (1.54 m), weight 143 lb (64.9 kg), SpO2 96 %. General appearance - well developed, no acute distress. TMs: Right ear shows cerumen impaction. Left TM is clear. Chest - clear to auscultation. Heart - regular rate and rhythm without murmurs, rubs, or gallops. Abdomen - bowel sounds present x 4, NT, ND  Extremities - no CCE. Right shoulder pain with internal rotation. An electronic signature was used to authenticate this note.   -- Keyon Aleman NP

## 2022-06-09 ENCOUNTER — OFFICE VISIT (OUTPATIENT)
Dept: FAMILY MEDICINE CLINIC | Age: 47
End: 2022-06-09

## 2022-06-09 VITALS
WEIGHT: 145 LBS | TEMPERATURE: 97.9 F | BODY MASS INDEX: 27.38 KG/M2 | HEIGHT: 61 IN | HEART RATE: 79 BPM | DIASTOLIC BLOOD PRESSURE: 79 MMHG | OXYGEN SATURATION: 98 % | SYSTOLIC BLOOD PRESSURE: 133 MMHG

## 2022-06-09 DIAGNOSIS — H61.21 IMPACTED CERUMEN OF RIGHT EAR: Primary | ICD-10-CM

## 2022-06-09 DIAGNOSIS — L98.9 SKIN LESION OF BACK: ICD-10-CM

## 2022-06-09 NOTE — PROGRESS NOTES
An After Visit Summary was printed and given to the patient. Patient discharged from clinic in stable condition.

## 2022-06-09 NOTE — PROGRESS NOTES
Coordination of Care  1. Have you been to the ER, urgent care clinic since your last visit? Hospitalized since your last visit? No    2. Have you seen or consulted any other health care providers outside of the [address] [place] [address] since your last visit? Include any pap smears or colon screening. No    Does the patient need refills? NO    Learning Assessment Complete?  yes  Depression Screening complete in the past 12 months? yes

## 2022-06-09 NOTE — PROGRESS NOTES
2022 : Svetlanaleodan Koehler Abisai Orourke (: 1975) is a 56 y.o. female, established patient, here for evaluation of the following chief complaint(s): Wax in Ear (Ear lavage f/up) and Skin Problem (Pt states she has an small ball on right side of her back and she c/o shoulder pain.)     ASSESSMENT/PLAN:  Below is the assessment and plan developed based on review of pertinent history, physical exam, labs, studies, and medications. 1. Impacted cerumen of right ear  -     EAR IRRIGATION  2. Skin lesion of back  -warm compresses on the back, will also help upper back musculature    Return if symptoms worsen or fail to improve. SUBJECTIVE/OBJECTIVE:  HPI Still has shoulder pain. May have blackhead. Right ear still feels clogged. No results found for any visits on 22. Review of Systems: Negative for: fever, chest pain, shortness of breath, leg swelling. Social History:  reports that she has never smoked. She has never used smokeless tobacco. She reports that she does not drink alcohol and does not use drugs. Current Medications:   Current Outpatient Medications   Medication Sig    lisinopril-hydroCHLOROthiazide (PRINZIDE, ZESTORETIC) 20-12.5 mg per tablet TAKE 1 TABLET BY MOUTH DAILY FOR BLOOD PRESSURE    esomeprazole (NEXIUM) 40 mg capsule Take 1 Capsule by mouth daily.  ibuprofen (MOTRIN) 600 mg tablet Take 1 Tablet by mouth every six (6) hours as needed for Pain.  polyethylene glycol (MIRALAX) 17 gram/dose powder Take 17 g by mouth daily. Indications: constipation (Patient not taking: Reported on 2022)     Physical Examination: No LMP recorded. (Menstrual status: Menopause). Blood pressure 133/79, pulse 79, temperature 97.9 °F (36.6 °C), temperature source Temporal, height 5' 0.63\" (1.54 m), weight 145 lb (65.8 kg), SpO2 98 %. General appearance - well developed, no acute distress. Chest - clear to auscultation.   Heart - regular rate and rhythm without murmurs, rubs, or gallops. Abdomen - bowel sounds present x 4, NT, ND  Extremities - no CCE. Right ear hurts her - + impacted cerumen. Left ear has soft wax and doesn't bother her. Ear lavage performed with no complications. Patient stated the ear felt better. Blackhead vs blue nevus. Mildly tender with palpation. No pus or blood. An electronic signature was used to authenticate this note.   -- Tona Marie, NP

## 2022-11-14 DIAGNOSIS — I10 ESSENTIAL HYPERTENSION: ICD-10-CM

## 2022-11-14 RX ORDER — LISINOPRIL AND HYDROCHLOROTHIAZIDE 12.5; 2 MG/1; MG/1
TABLET ORAL
Qty: 90 TABLET | Refills: 1 | Status: SHIPPED | OUTPATIENT
Start: 2022-11-14

## 2023-05-18 ENCOUNTER — OFFICE VISIT (OUTPATIENT)
Age: 48
End: 2023-05-18

## 2023-05-18 ENCOUNTER — HOSPITAL ENCOUNTER (OUTPATIENT)
Facility: HOSPITAL | Age: 48
Setting detail: SPECIMEN
Discharge: HOME | End: 2023-05-21

## 2023-05-18 VITALS
OXYGEN SATURATION: 96 % | TEMPERATURE: 98.2 F | SYSTOLIC BLOOD PRESSURE: 128 MMHG | DIASTOLIC BLOOD PRESSURE: 75 MMHG | BODY MASS INDEX: 27.77 KG/M2 | HEART RATE: 95 BPM | WEIGHT: 145.2 LBS

## 2023-05-18 DIAGNOSIS — I10 ESSENTIAL (PRIMARY) HYPERTENSION: Primary | ICD-10-CM

## 2023-05-18 DIAGNOSIS — G89.29 CHRONIC RIGHT SHOULDER PAIN: ICD-10-CM

## 2023-05-18 DIAGNOSIS — M25.511 CHRONIC RIGHT SHOULDER PAIN: ICD-10-CM

## 2023-05-18 LAB
ALBUMIN SERPL-MCNC: 3.9 G/DL (ref 3.5–5)
ALBUMIN/GLOB SERPL: 1.2 (ref 1.1–2.2)
ALP SERPL-CCNC: 99 U/L (ref 45–117)
ALT SERPL-CCNC: 34 U/L (ref 12–78)
ANION GAP SERPL CALC-SCNC: 6 MMOL/L (ref 5–15)
AST SERPL-CCNC: 22 U/L (ref 15–37)
BILIRUB SERPL-MCNC: 0.7 MG/DL (ref 0.2–1)
BUN SERPL-MCNC: 14 MG/DL (ref 6–20)
BUN/CREAT SERPL: 24 (ref 12–20)
CALCIUM SERPL-MCNC: 9.2 MG/DL (ref 8.5–10.1)
CHLORIDE SERPL-SCNC: 106 MMOL/L (ref 97–108)
CHOLEST SERPL-MCNC: 166 MG/DL
CO2 SERPL-SCNC: 28 MMOL/L (ref 21–32)
CREAT SERPL-MCNC: 0.59 MG/DL (ref 0.55–1.02)
ERYTHROCYTE [DISTWIDTH] IN BLOOD BY AUTOMATED COUNT: 12 % (ref 11.5–14.5)
GLOBULIN SER CALC-MCNC: 3.2 G/DL (ref 2–4)
GLUCOSE SERPL-MCNC: 152 MG/DL (ref 65–100)
HCT VFR BLD AUTO: 39.4 % (ref 35–47)
HDLC SERPL-MCNC: 45 MG/DL
HDLC SERPL: 3.7 (ref 0–5)
HGB BLD-MCNC: 13.1 G/DL (ref 11.5–16)
LDLC SERPL CALC-MCNC: 67.6 MG/DL (ref 0–100)
MCH RBC QN AUTO: 29.1 PG (ref 26–34)
MCHC RBC AUTO-ENTMCNC: 33.2 G/DL (ref 30–36.5)
MCV RBC AUTO: 87.6 FL (ref 80–99)
NRBC # BLD: 0 K/UL (ref 0–0.01)
NRBC BLD-RTO: 0 PER 100 WBC
PLATELET # BLD AUTO: 309 K/UL (ref 150–400)
PMV BLD AUTO: 9.8 FL (ref 8.9–12.9)
POTASSIUM SERPL-SCNC: 3.6 MMOL/L (ref 3.5–5.1)
PROT SERPL-MCNC: 7.1 G/DL (ref 6.4–8.2)
RBC # BLD AUTO: 4.5 M/UL (ref 3.8–5.2)
SODIUM SERPL-SCNC: 140 MMOL/L (ref 136–145)
TRIGL SERPL-MCNC: 267 MG/DL
TSH SERPL DL<=0.05 MIU/L-ACNC: <0.01 UIU/ML (ref 0.36–3.74)
VLDLC SERPL CALC-MCNC: 53.4 MG/DL
WBC # BLD AUTO: 4.8 K/UL (ref 3.6–11)

## 2023-05-18 PROCEDURE — 3078F DIAST BP <80 MM HG: CPT | Performed by: FAMILY MEDICINE

## 2023-05-18 PROCEDURE — 3074F SYST BP LT 130 MM HG: CPT | Performed by: FAMILY MEDICINE

## 2023-05-18 PROCEDURE — 99214 OFFICE O/P EST MOD 30 MIN: CPT | Performed by: FAMILY MEDICINE

## 2023-05-18 RX ORDER — IBUPROFEN 600 MG/1
600 TABLET ORAL EVERY 6 HOURS PRN
COMMUNITY
Start: 2022-05-26

## 2023-05-18 RX ORDER — CYCLOBENZAPRINE HCL 10 MG
10 TABLET ORAL NIGHTLY PRN
Qty: 30 TABLET | Refills: 1 | Status: SHIPPED | OUTPATIENT
Start: 2023-05-18

## 2023-05-18 RX ORDER — ESOMEPRAZOLE MAGNESIUM 40 MG/1
40 CAPSULE, DELAYED RELEASE ORAL DAILY
COMMUNITY
Start: 2022-05-26

## 2023-05-18 RX ORDER — CELECOXIB 200 MG/1
200 CAPSULE ORAL DAILY
Qty: 30 CAPSULE | Refills: 1 | Status: SHIPPED | OUTPATIENT
Start: 2023-05-18

## 2023-05-18 RX ORDER — LISINOPRIL AND HYDROCHLOROTHIAZIDE 20; 12.5 MG/1; MG/1
1 TABLET ORAL DAILY
Qty: 90 TABLET | Refills: 3 | Status: SHIPPED | OUTPATIENT
Start: 2023-05-18

## 2023-05-18 RX ORDER — LISINOPRIL AND HYDROCHLOROTHIAZIDE 20; 12.5 MG/1; MG/1
1 TABLET ORAL DAILY
COMMUNITY
Start: 2022-11-14 | End: 2023-05-18 | Stop reason: SDUPTHER

## 2023-05-18 ASSESSMENT — PATIENT HEALTH QUESTIONNAIRE - PHQ9
SUM OF ALL RESPONSES TO PHQ QUESTIONS 1-9: 0
1. LITTLE INTEREST OR PLEASURE IN DOING THINGS: 0
SUM OF ALL RESPONSES TO PHQ QUESTIONS 1-9: 0
2. FEELING DOWN, DEPRESSED OR HOPELESS: 0
SUM OF ALL RESPONSES TO PHQ9 QUESTIONS 1 & 2: 0

## 2023-05-18 ASSESSMENT — ENCOUNTER SYMPTOMS
COUGH: 0
SHORTNESS OF BREATH: 0
CHEST TIGHTNESS: 0

## 2023-05-18 NOTE — PROGRESS NOTES
1. \"Have you been to the ER, urgent care clinic since your last visit? Hospitalized since your last visit? \" No    2. \"Have you seen or consulted any other health care providers outside of the [address] [place] [address] since your last visit? \" No     3. For patients aged 39-70: Has the patient had a colonoscopy / FIT/ Cologuard? No      If the patient is female:    4. For patients aged 41-77: Has the patient had a mammogram within the past 2 years? Yes - Care Gap present. Rooming MA/LPN to request most recent results      5. For patients aged 21-65: Has the patient had a pap smear?  Yes - no Care Gap present

## 2023-05-18 NOTE — PROGRESS NOTES
Destini Renee (: 1975) is a 53 y.o. female, Established patient patient, here for evaluation of the following chief complaint(s):  Hypertension (Needs refills ) and Shoulder Pain (R shoulder pain)       ASSESSMENT/PLAN:  1. Essential (primary) hypertension  Controlled, continue current medication  -     CBC; Future  -     Lipid Panel; Future  -     Comprehensive Metabolic Panel; Future  -     TSH; Future  2. Chronic right shoulder pain  Impingement and muscle spasm from overuse. Start Celebrex, Flexeril (SE reviewed). Recheck and consider PT if not completely improved since it is recurrent. Return for follow up F2F in 6 weeks for Rt shoulder pain, 2 weeks VV for follow up and labs. SUBJECTIVE:  Patient presents for BP check  HTN:  Patient is taking Lisinopril/HCTZ regularly without any side effects. Rt shoulder pain:  hx of recurrent Rt shoulder pain (noted in 2018). 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 Latest flare started about 1 month ago. Insomnia:  falls asleep but wakes and cannot fall back asleep. Since . Fhx: M - HTN. No CAD. Shx: no smoking  PMHx: no surgery. Review of Systems   Constitutional:  Negative for unexpected weight change. Respiratory:  Negative for cough, chest tightness and shortness of breath. Cardiovascular:  Negative for chest pain, palpitations and leg swelling. Neurological:  Negative for light-headedness. LMP 2 years ago. OBJECTIVE:  Blood pressure 128/75, pulse 95, temperature 98.2 °F (36.8 °C), temperature source Temporal, weight 145 lb 3.2 oz (65.9 kg), SpO2 96 %. Physical Exam  CONSTITUTIONAL:  Well developed. No apparent distress. PSYCHIATRIC: Oriented to time, place, person & situation. Appropriate mood and affect. NECK:  Normal inspection, normal palpation without any lymphadenopathy, masses, or thyromegaly  CARDIOVASCULAR:  Regular rate and rhythm. Normal S1, S2. No extra sounds.   RESPIRATORY: Was the patient seen in the last year in this department? Yes     Does patient have an active prescription for medications requested? Yes     Received Request Via: Pharmacy

## 2023-05-18 NOTE — PROGRESS NOTES
Name and  confirmed w/ patient. An After Visit Summary was provided and all discharge instructions were reviewed with the patient including: new medications, side-effects, f/up appt, and Goodrx coupon use. Time for questions and answers provided, patient verbalized understanding. Patient discharged from clinic in stable condition. [facility]  # assisted with interpretation.

## 2023-05-18 NOTE — CONSULTS
Session ID: [id number]  Request [id number]  Language:Qatari  Status:Fulfilled  Agent NX:#[id number]  Agent Name:Jericho

## 2023-06-06 ENCOUNTER — TELEMEDICINE (OUTPATIENT)
Age: 48
End: 2023-06-06

## 2023-06-06 DIAGNOSIS — G89.29 CHRONIC RIGHT SHOULDER PAIN: ICD-10-CM

## 2023-06-06 DIAGNOSIS — E05.90 HYPERTHYROIDISM: Primary | ICD-10-CM

## 2023-06-06 DIAGNOSIS — M25.511 CHRONIC RIGHT SHOULDER PAIN: ICD-10-CM

## 2023-06-06 DIAGNOSIS — R73.9 HYPERGLYCEMIA: ICD-10-CM

## 2023-06-06 PROCEDURE — 99441 PR PHYS/QHP TELEPHONE EVALUATION 5-10 MIN: CPT | Performed by: FAMILY MEDICINE

## 2023-06-06 ASSESSMENT — PATIENT HEALTH QUESTIONNAIRE - PHQ9
SUM OF ALL RESPONSES TO PHQ QUESTIONS 1-9: 0
SUM OF ALL RESPONSES TO PHQ9 QUESTIONS 1 & 2: 0
2. FEELING DOWN, DEPRESSED OR HOPELESS: 0
1. LITTLE INTEREST OR PLEASURE IN DOING THINGS: 0
SUM OF ALL RESPONSES TO PHQ QUESTIONS 1-9: 0

## 2023-06-06 ASSESSMENT — ENCOUNTER SYMPTOMS
DIARRHEA: 0
NAUSEA: 0
COUGH: 0

## 2023-06-06 NOTE — CONSULTS
Session ID: [id number]  Request ID: [id number]  Language: Serbian  Status: Fulfilled   ID: #[id number]   Name: Ravinder Garcia

## 2023-06-06 NOTE — PROGRESS NOTES
Destini Renee (: 1975) is a 53 y.o. female, Established patient patient, Virtual Visit for evaluation of the following chief complaint(s):   Hypertension (Follow up ) and Discuss Labs       ASSESSMENT/PLAN:  1. Hyperthyroidism  Reviewed sx. Will have her hold her MVI to see if these are causing an abnormal result. Will follow up as scheduled later this month. -     TSH; Future  -     T4, Free; Future  -     T3, Free; Future  -     Thyroid Peroxidase Antibody; Future  -     Thyrotropin receptor antibody; Future  -     Sedimentation Rate; Future  2. Hyperglycemia  -     Hemoglobin A1C; Future  3. Chronic right shoulder pain  -     Vitamin D 25 Hydroxy; Future  -     Sedimentation Rate; Future    Return for lab follow up next available for labs ordered today. .    SUBJECTIVE/OBJECTIVE:  VV for lab review  Patient with HTN, chronic shoulder pain. Has noted more palpitations recently. She does take some MVI. Review of Systems   Constitutional:  Negative for chills, fever and unexpected weight change. Respiratory:  Negative for cough. Cardiovascular:  Positive for palpitations. Negative for chest pain and leg swelling. Gastrointestinal:  Negative for diarrhea and nausea. Musculoskeletal:  Positive for arthralgias. Negative for myalgias. Neurological:  Negative for tremors and weakness. Patient-Reported Vitals  No data recorded     Physical Exam    On this date 2023 I have spent 10 minutes reviewing previous notes, test results and face to face (virtual) with the patient discussing the diagnosis and importance of compliance with the treatment plan as well as documenting on the day of the visit. Destini Renee, was evaluated through a synchronous (real-time) audio-video encounter. The patient (or guardian if applicable) is aware that this is a billable service, which includes applicable co-pays.  This Virtual Visit was conducted with patient's (and/or legal

## 2023-06-06 NOTE — PROGRESS NOTES
Patient has been scheduled for lab visit and then a visit for follow up care with Dr. Juani Horn LPN

## 2023-06-06 NOTE — PROGRESS NOTES
[facility] services:  [id number]. Edmar Montano LPN    Patient name and  verified. Edmar Montano LPN    Chief Complaint   Patient presents with    Hypertension     Follow up     Discuss Labs     1. \"Have you been to the ER, urgent care clinic since your last visit? Hospitalized since your last visit? \" No    2. \"Have you seen or consulted any other health care providers outside of the [address] [place] [address] since your last visit? \" No     3. For patients aged 39-70: Has the patient had a colonoscopy / FIT/ Cologuard? No      If the patient is female:    4. For patients aged 41-77: Has the patient had a mammogram within the past 2 years? No      5. For patients aged 21-65: Has the patient had a pap smear?  No

## 2023-06-15 ENCOUNTER — HOSPITAL ENCOUNTER (OUTPATIENT)
Facility: HOSPITAL | Age: 48
Setting detail: SPECIMEN
Discharge: HOME | End: 2023-06-18

## 2023-06-15 LAB
COMMENT:: NORMAL
SPECIMEN HOLD: NORMAL
T3FREE SERPL-MCNC: 5.4 PG/ML (ref 2.2–4)
T4 FREE SERPL-MCNC: 1.5 NG/DL (ref 0.8–1.5)
TSH SERPL DL<=0.05 MIU/L-ACNC: <0.01 UIU/ML (ref 0.36–3.74)

## 2023-06-15 PROCEDURE — 86376 MICROSOMAL ANTIBODY EACH: CPT

## 2023-06-15 PROCEDURE — 84481 FREE ASSAY (FT-3): CPT

## 2023-06-15 PROCEDURE — 84439 ASSAY OF FREE THYROXINE: CPT

## 2023-06-15 PROCEDURE — 36415 COLL VENOUS BLD VENIPUNCTURE: CPT

## 2023-06-15 PROCEDURE — 82306 VITAMIN D 25 HYDROXY: CPT

## 2023-06-15 PROCEDURE — 83520 IMMUNOASSAY QUANT NOS NONAB: CPT

## 2023-06-15 PROCEDURE — 84443 ASSAY THYROID STIM HORMONE: CPT

## 2023-06-16 LAB — 25(OH)D3 SERPL-MCNC: 22.2 NG/ML (ref 30–100)

## 2023-06-17 LAB
THYROPEROXIDASE AB SERPL-ACNC: <9 IU/ML (ref 0–34)
TSH RECEP AB SER-ACNC: <1.1 IU/L (ref 0–1.75)

## 2023-06-30 ENCOUNTER — OFFICE VISIT (OUTPATIENT)
Age: 48
End: 2023-06-30

## 2023-06-30 VITALS
BODY MASS INDEX: 27.89 KG/M2 | SYSTOLIC BLOOD PRESSURE: 132 MMHG | DIASTOLIC BLOOD PRESSURE: 71 MMHG | TEMPERATURE: 98.2 F | WEIGHT: 145.8 LBS | OXYGEN SATURATION: 97 % | HEART RATE: 87 BPM

## 2023-06-30 DIAGNOSIS — E05.90 HYPERTHYROIDISM: Primary | ICD-10-CM

## 2023-06-30 DIAGNOSIS — E55.9 VITAMIN D DEFICIENCY: ICD-10-CM

## 2023-06-30 PROCEDURE — 3078F DIAST BP <80 MM HG: CPT | Performed by: FAMILY MEDICINE

## 2023-06-30 PROCEDURE — 99213 OFFICE O/P EST LOW 20 MIN: CPT | Performed by: FAMILY MEDICINE

## 2023-06-30 PROCEDURE — 3075F SYST BP GE 130 - 139MM HG: CPT | Performed by: FAMILY MEDICINE

## 2023-06-30 RX ORDER — MELATONIN
1000 DAILY
Qty: 90 TABLET | Refills: 1 | Status: SHIPPED | OUTPATIENT
Start: 2023-06-30

## 2023-06-30 SDOH — ECONOMIC STABILITY: HOUSING INSECURITY: IN THE LAST 12 MONTHS, HOW MANY PLACES HAVE YOU LIVED?: 1

## 2023-06-30 SDOH — ECONOMIC STABILITY: TRANSPORTATION INSECURITY
IN THE PAST 12 MONTHS, HAS THE LACK OF TRANSPORTATION KEPT YOU FROM MEDICAL APPOINTMENTS OR FROM GETTING MEDICATIONS?: NO

## 2023-06-30 SDOH — ECONOMIC STABILITY: FOOD INSECURITY: WITHIN THE PAST 12 MONTHS, YOU WORRIED THAT YOUR FOOD WOULD RUN OUT BEFORE YOU GOT MONEY TO BUY MORE.: NEVER TRUE

## 2023-06-30 SDOH — ECONOMIC STABILITY: TRANSPORTATION INSECURITY
IN THE PAST 12 MONTHS, HAS LACK OF TRANSPORTATION KEPT YOU FROM MEETINGS, WORK, OR FROM GETTING THINGS NEEDED FOR DAILY LIVING?: NO

## 2023-06-30 SDOH — ECONOMIC STABILITY: HOUSING INSECURITY
IN THE LAST 12 MONTHS, WAS THERE A TIME WHEN YOU DID NOT HAVE A STEADY PLACE TO SLEEP OR SLEPT IN A SHELTER (INCLUDING NOW)?: NO

## 2023-06-30 SDOH — ECONOMIC STABILITY: INCOME INSECURITY: IN THE LAST 12 MONTHS, WAS THERE A TIME WHEN YOU WERE NOT ABLE TO PAY THE MORTGAGE OR RENT ON TIME?: NO

## 2023-06-30 SDOH — ECONOMIC STABILITY: FOOD INSECURITY: WITHIN THE PAST 12 MONTHS, THE FOOD YOU BOUGHT JUST DIDN'T LAST AND YOU DIDN'T HAVE MONEY TO GET MORE.: NEVER TRUE

## 2023-06-30 ASSESSMENT — ENCOUNTER SYMPTOMS
DIARRHEA: 0
CHEST TIGHTNESS: 0

## 2023-06-30 ASSESSMENT — SOCIAL DETERMINANTS OF HEALTH (SDOH)
WITHIN THE LAST YEAR, HAVE YOU BEEN KICKED, HIT, SLAPPED, OR OTHERWISE PHYSICALLY HURT BY YOUR PARTNER OR EX-PARTNER?: NO
WITHIN THE LAST YEAR, HAVE TO BEEN RAPED OR FORCED TO HAVE ANY KIND OF SEXUAL ACTIVITY BY YOUR PARTNER OR EX-PARTNER?: NO
WITHIN THE LAST YEAR, HAVE YOU BEEN HUMILIATED OR EMOTIONALLY ABUSED IN OTHER WAYS BY YOUR PARTNER OR EX-PARTNER?: NO
HOW HARD IS IT FOR YOU TO PAY FOR THE VERY BASICS LIKE FOOD, HOUSING, MEDICAL CARE, AND HEATING?: NOT HARD AT ALL
WITHIN THE LAST YEAR, HAVE YOU BEEN AFRAID OF YOUR PARTNER OR EX-PARTNER?: NO

## 2023-06-30 ASSESSMENT — PATIENT HEALTH QUESTIONNAIRE - PHQ9
2. FEELING DOWN, DEPRESSED OR HOPELESS: 0
SUM OF ALL RESPONSES TO PHQ QUESTIONS 1-9: 0
1. LITTLE INTEREST OR PLEASURE IN DOING THINGS: 0
SUM OF ALL RESPONSES TO PHQ QUESTIONS 1-9: 0
SUM OF ALL RESPONSES TO PHQ QUESTIONS 1-9: 0
SUM OF ALL RESPONSES TO PHQ9 QUESTIONS 1 & 2: 0
SUM OF ALL RESPONSES TO PHQ QUESTIONS 1-9: 0

## 2025-04-16 NOTE — PROGRESS NOTES
CMA made t/c to patient to review test results per provider instructions,  name an  were verified w/ patient. CMA informed patient that Negative Pap/HPV. Repeat in 5 years. Dec 2026. Please inform the patient. Thank you. This has been fully explained to the patient, who indicates understanding.
Negative Pap/HPV. Repeat in 5 years. Dec 2026. Please inform the patient. Thank you.
minimal